# Patient Record
Sex: FEMALE | Race: WHITE | Employment: UNEMPLOYED
[De-identification: names, ages, dates, MRNs, and addresses within clinical notes are randomized per-mention and may not be internally consistent; named-entity substitution may affect disease eponyms.]

---

## 2017-01-17 ENCOUNTER — TELEPHONE (OUTPATIENT)
Dept: NEUROLOGY | Facility: CLINIC | Age: 57
End: 2017-01-17

## 2017-01-17 RX ORDER — BUTALBITAL, ACETAMINOPHEN AND CAFFEINE 50; 325; 40 MG/1; MG/1; MG/1
TABLET ORAL
Qty: 30 TABLET | Refills: 0 | Status: SHIPPED | OUTPATIENT
Start: 2017-01-17 | End: 2017-02-14 | Stop reason: SDUPTHER

## 2017-01-27 DIAGNOSIS — G43.109 MIGRAINE WITH AURA AND WITHOUT STATUS MIGRAINOSUS, NOT INTRACTABLE: ICD-10-CM

## 2017-01-31 ENCOUNTER — TELEPHONE (OUTPATIENT)
Dept: NEUROLOGY | Facility: CLINIC | Age: 57
End: 2017-01-31

## 2017-01-31 DIAGNOSIS — G43.109 MIGRAINE WITH AURA AND WITHOUT STATUS MIGRAINOSUS, NOT INTRACTABLE: ICD-10-CM

## 2017-01-31 RX ORDER — MEPERIDINE HYDROCHLORIDE 50 MG/1
50 TABLET ORAL EVERY 4 HOURS PRN
Qty: 20 TABLET | Refills: 0 | Status: SHIPPED | OUTPATIENT
Start: 2017-01-31 | End: 2017-03-01 | Stop reason: SDUPTHER

## 2017-02-15 RX ORDER — BUTALBITAL, ACETAMINOPHEN AND CAFFEINE 50; 325; 40 MG/1; MG/1; MG/1
TABLET ORAL
Qty: 30 TABLET | Refills: 0 | Status: SHIPPED | OUTPATIENT
Start: 2017-02-15 | End: 2017-03-12 | Stop reason: SDUPTHER

## 2017-03-01 ENCOUNTER — TELEPHONE (OUTPATIENT)
Dept: NEUROLOGY | Facility: CLINIC | Age: 57
End: 2017-03-01

## 2017-03-01 DIAGNOSIS — G43.109 MIGRAINE WITH AURA AND WITHOUT STATUS MIGRAINOSUS, NOT INTRACTABLE: ICD-10-CM

## 2017-03-01 RX ORDER — MEPERIDINE HYDROCHLORIDE 50 MG/1
50 TABLET ORAL EVERY 4 HOURS PRN
Qty: 20 TABLET | Refills: 0 | Status: SHIPPED | OUTPATIENT
Start: 2017-03-01 | End: 2017-03-30 | Stop reason: SDUPTHER

## 2017-03-01 RX ORDER — CODEINE/BUTALBITAL/ASA/CAFFEIN 30-50-325
CAPSULE ORAL
Qty: 30 CAPSULE | Refills: 0 | Status: SHIPPED | OUTPATIENT
Start: 2017-03-01 | End: 2017-03-30 | Stop reason: SDUPTHER

## 2017-03-15 RX ORDER — BUTALBITAL, ACETAMINOPHEN AND CAFFEINE 50; 325; 40 MG/1; MG/1; MG/1
TABLET ORAL
Qty: 30 TABLET | Refills: 0 | Status: SHIPPED | OUTPATIENT
Start: 2017-03-15 | End: 2017-04-12 | Stop reason: SDUPTHER

## 2017-03-30 ENCOUNTER — TELEPHONE (OUTPATIENT)
Dept: NEUROLOGY | Age: 57
End: 2017-03-30

## 2017-03-30 DIAGNOSIS — G43.109 MIGRAINE WITH AURA AND WITHOUT STATUS MIGRAINOSUS, NOT INTRACTABLE: ICD-10-CM

## 2017-03-30 RX ORDER — MEPERIDINE HYDROCHLORIDE 50 MG/1
50 TABLET ORAL EVERY 4 HOURS PRN
Qty: 20 TABLET | Refills: 0 | Status: SHIPPED | OUTPATIENT
Start: 2017-03-30 | End: 2017-05-01 | Stop reason: SDUPTHER

## 2017-03-30 RX ORDER — CODEINE/BUTALBITAL/ASA/CAFFEIN 30-50-325
CAPSULE ORAL
Qty: 30 CAPSULE | Refills: 0 | Status: SHIPPED | OUTPATIENT
Start: 2017-03-30 | End: 2017-05-01 | Stop reason: SDUPTHER

## 2017-04-12 RX ORDER — BUTALBITAL, ACETAMINOPHEN AND CAFFEINE 50; 325; 40 MG/1; MG/1; MG/1
TABLET ORAL
Qty: 30 TABLET | Refills: 0 | Status: SHIPPED | OUTPATIENT
Start: 2017-04-12 | End: 2017-05-09 | Stop reason: SDUPTHER

## 2017-04-26 ENCOUNTER — TELEPHONE (OUTPATIENT)
Dept: NEUROLOGY | Age: 57
End: 2017-04-26

## 2017-05-01 DIAGNOSIS — G43.109 MIGRAINE WITH AURA AND WITHOUT STATUS MIGRAINOSUS, NOT INTRACTABLE: ICD-10-CM

## 2017-05-01 RX ORDER — MEPERIDINE HYDROCHLORIDE 50 MG/1
50 TABLET ORAL EVERY 4 HOURS PRN
Qty: 20 TABLET | Refills: 0 | Status: SHIPPED | OUTPATIENT
Start: 2017-05-01 | End: 2017-05-31 | Stop reason: SDUPTHER

## 2017-05-01 RX ORDER — CODEINE/BUTALBITAL/ASA/CAFFEIN 30-50-325
CAPSULE ORAL
Qty: 30 CAPSULE | Refills: 0 | Status: SHIPPED | OUTPATIENT
Start: 2017-05-01 | End: 2017-05-31 | Stop reason: SDUPTHER

## 2017-05-02 RX ORDER — CODEINE/BUTALBITAL/ASA/CAFFEIN 30-50-325
1 CAPSULE ORAL EVERY 4 HOURS PRN
Qty: 30 CAPSULE | Refills: 0 | Status: SHIPPED | OUTPATIENT
Start: 2017-05-02 | End: 2017-05-03 | Stop reason: SDUPTHER

## 2017-05-02 RX ORDER — CODEINE/BUTALBITAL/ASA/CAFFEIN 30-50-325
CAPSULE ORAL
Qty: 30 CAPSULE | Refills: 0 | Status: CANCELLED | OUTPATIENT
Start: 2017-05-02

## 2017-05-03 ENCOUNTER — TELEPHONE (OUTPATIENT)
Dept: NEUROLOGY | Age: 57
End: 2017-05-03

## 2017-05-11 RX ORDER — BUTALBITAL, ACETAMINOPHEN AND CAFFEINE 50; 325; 40 MG/1; MG/1; MG/1
TABLET ORAL
Qty: 30 TABLET | Refills: 0 | Status: SHIPPED | OUTPATIENT
Start: 2017-05-11 | End: 2017-06-07 | Stop reason: SDUPTHER

## 2017-05-30 ENCOUNTER — TELEPHONE (OUTPATIENT)
Dept: NEUROLOGY | Age: 57
End: 2017-05-30

## 2017-05-31 DIAGNOSIS — G43.109 MIGRAINE WITH AURA AND WITHOUT STATUS MIGRAINOSUS, NOT INTRACTABLE: ICD-10-CM

## 2017-05-31 RX ORDER — CODEINE/BUTALBITAL/ASA/CAFFEIN 30-50-325
CAPSULE ORAL
Qty: 30 CAPSULE | Refills: 0 | Status: SHIPPED | OUTPATIENT
Start: 2017-05-31 | End: 2017-06-29 | Stop reason: SDUPTHER

## 2017-05-31 RX ORDER — MEPERIDINE HYDROCHLORIDE 50 MG/1
50 TABLET ORAL EVERY 4 HOURS PRN
Qty: 20 TABLET | Refills: 0 | Status: SHIPPED | OUTPATIENT
Start: 2017-05-31 | End: 2017-06-28 | Stop reason: SDUPTHER

## 2017-06-12 RX ORDER — BUTALBITAL, ACETAMINOPHEN AND CAFFEINE 50; 325; 40 MG/1; MG/1; MG/1
TABLET ORAL
Qty: 30 TABLET | Refills: 0 | OUTPATIENT
Start: 2017-06-12 | End: 2017-06-28 | Stop reason: SDUPTHER

## 2017-06-28 ENCOUNTER — TELEPHONE (OUTPATIENT)
Dept: NEUROLOGY | Age: 57
End: 2017-06-28

## 2017-06-28 DIAGNOSIS — G43.109 MIGRAINE WITH AURA AND WITHOUT STATUS MIGRAINOSUS, NOT INTRACTABLE: ICD-10-CM

## 2017-06-28 RX ORDER — BUTALBITAL, ACETAMINOPHEN AND CAFFEINE 50; 325; 40 MG/1; MG/1; MG/1
TABLET ORAL
Qty: 30 TABLET | Refills: 0 | Status: SHIPPED | OUTPATIENT
Start: 2017-06-28 | End: 2017-06-28 | Stop reason: CLARIF

## 2017-06-28 RX ORDER — MEPERIDINE HYDROCHLORIDE 50 MG/1
50 TABLET ORAL EVERY 4 HOURS PRN
Qty: 20 TABLET | Refills: 0 | Status: SHIPPED | OUTPATIENT
Start: 2017-06-28 | End: 2017-07-27 | Stop reason: SDUPTHER

## 2017-06-29 ENCOUNTER — TELEPHONE (OUTPATIENT)
Dept: NEUROLOGY | Age: 57
End: 2017-06-29

## 2017-06-29 RX ORDER — CODEINE/BUTALBITAL/ASA/CAFFEIN 30-50-325
CAPSULE ORAL
Qty: 30 CAPSULE | Refills: 0 | Status: SHIPPED | OUTPATIENT
Start: 2017-06-29 | End: 2017-07-26 | Stop reason: SDUPTHER

## 2017-07-12 RX ORDER — BUTALBITAL, ACETAMINOPHEN AND CAFFEINE 50; 325; 40 MG/1; MG/1; MG/1
TABLET ORAL
Qty: 30 TABLET | Refills: 0 | Status: SHIPPED | OUTPATIENT
Start: 2017-07-12 | End: 2017-09-08 | Stop reason: SDUPTHER

## 2017-07-27 DIAGNOSIS — G43.109 MIGRAINE WITH AURA AND WITHOUT STATUS MIGRAINOSUS, NOT INTRACTABLE: ICD-10-CM

## 2017-07-27 RX ORDER — MEPERIDINE HYDROCHLORIDE 50 MG/1
50 TABLET ORAL EVERY 4 HOURS PRN
Qty: 20 TABLET | Refills: 0 | Status: SHIPPED | OUTPATIENT
Start: 2017-07-27 | End: 2017-08-24 | Stop reason: SDUPTHER

## 2017-08-15 DIAGNOSIS — G43.109 MIGRAINE WITH AURA AND WITHOUT STATUS MIGRAINOSUS, NOT INTRACTABLE: ICD-10-CM

## 2017-08-15 RX ORDER — PROMETHAZINE HYDROCHLORIDE 25 MG/1
TABLET ORAL
Qty: 90 TABLET | Refills: 0 | Status: SHIPPED | OUTPATIENT
Start: 2017-08-15 | End: 2020-05-26 | Stop reason: SDUPTHER

## 2017-08-15 RX ORDER — VERAPAMIL HYDROCHLORIDE 240 MG/1
240 CAPSULE, EXTENDED RELEASE ORAL NIGHTLY
Qty: 90 CAPSULE | Refills: 2 | Status: SHIPPED | OUTPATIENT
Start: 2017-08-15 | End: 2018-08-07 | Stop reason: ALTCHOICE

## 2017-08-24 ENCOUNTER — TELEPHONE (OUTPATIENT)
Dept: NEUROLOGY | Age: 57
End: 2017-08-24

## 2017-08-24 DIAGNOSIS — G43.109 MIGRAINE WITH AURA AND WITHOUT STATUS MIGRAINOSUS, NOT INTRACTABLE: ICD-10-CM

## 2017-08-24 RX ORDER — CODEINE/BUTALBITAL/ASA/CAFFEIN 30-50-325
CAPSULE ORAL
Qty: 30 CAPSULE | Refills: 0 | Status: SHIPPED | OUTPATIENT
Start: 2017-08-24 | End: 2017-09-21 | Stop reason: SDUPTHER

## 2017-08-24 RX ORDER — MEPERIDINE HYDROCHLORIDE 50 MG/1
50 TABLET ORAL EVERY 4 HOURS PRN
Qty: 20 TABLET | Refills: 0 | Status: SHIPPED | OUTPATIENT
Start: 2017-08-24 | End: 2017-09-21 | Stop reason: SDUPTHER

## 2017-09-11 RX ORDER — BUTALBITAL, ACETAMINOPHEN AND CAFFEINE 50; 325; 40 MG/1; MG/1; MG/1
TABLET ORAL
Qty: 30 TABLET | Refills: 0 | Status: SHIPPED | OUTPATIENT
Start: 2017-09-11 | End: 2017-10-10 | Stop reason: SDUPTHER

## 2017-09-21 ENCOUNTER — TELEPHONE (OUTPATIENT)
Dept: NEUROLOGY | Age: 57
End: 2017-09-21

## 2017-09-21 DIAGNOSIS — G43.109 MIGRAINE WITH AURA AND WITHOUT STATUS MIGRAINOSUS, NOT INTRACTABLE: ICD-10-CM

## 2017-09-21 RX ORDER — MEPERIDINE HYDROCHLORIDE 50 MG/1
50 TABLET ORAL EVERY 4 HOURS PRN
Qty: 20 TABLET | Refills: 0 | Status: SHIPPED | OUTPATIENT
Start: 2017-09-21 | End: 2017-10-23 | Stop reason: SDUPTHER

## 2017-09-21 RX ORDER — CODEINE/BUTALBITAL/ASA/CAFFEIN 30-50-325
CAPSULE ORAL
Qty: 30 CAPSULE | Refills: 0 | Status: SHIPPED | OUTPATIENT
Start: 2017-09-21 | End: 2017-10-23 | Stop reason: SDUPTHER

## 2017-10-11 RX ORDER — BUTALBITAL, ACETAMINOPHEN AND CAFFEINE 50; 325; 40 MG/1; MG/1; MG/1
TABLET ORAL
Qty: 30 TABLET | Refills: 0 | Status: SHIPPED | OUTPATIENT
Start: 2017-10-11 | End: 2017-11-10 | Stop reason: SDUPTHER

## 2017-10-23 ENCOUNTER — TELEPHONE (OUTPATIENT)
Dept: NEUROLOGY | Age: 57
End: 2017-10-23

## 2017-10-23 DIAGNOSIS — G43.109 MIGRAINE WITH AURA AND WITHOUT STATUS MIGRAINOSUS, NOT INTRACTABLE: ICD-10-CM

## 2017-10-23 RX ORDER — CODEINE/BUTALBITAL/ASA/CAFFEIN 30-50-325
CAPSULE ORAL
Qty: 30 CAPSULE | Refills: 0 | Status: SHIPPED | OUTPATIENT
Start: 2017-10-23 | End: 2017-11-21 | Stop reason: SDUPTHER

## 2017-10-23 RX ORDER — MEPERIDINE HYDROCHLORIDE 50 MG/1
50 TABLET ORAL EVERY 4 HOURS PRN
Qty: 20 TABLET | Refills: 0 | Status: SHIPPED | OUTPATIENT
Start: 2017-10-23 | End: 2017-11-21 | Stop reason: SDUPTHER

## 2017-10-23 NOTE — TELEPHONE ENCOUNTER
Shasha Zoe called in for a refill on her Demerol 50 mg. #20 and Fiorinal with codeine #30 to [facility] in [address] [facility].  An OARRS report was run in Sept.

## 2017-11-13 RX ORDER — BUTALBITAL, ACETAMINOPHEN AND CAFFEINE 50; 325; 40 MG/1; MG/1; MG/1
TABLET ORAL
Qty: 30 TABLET | Refills: 0 | OUTPATIENT
Start: 2017-11-13 | End: 2017-12-10 | Stop reason: SDUPTHER

## 2017-11-21 ENCOUNTER — TELEPHONE (OUTPATIENT)
Dept: NEUROLOGY | Age: 57
End: 2017-11-21

## 2017-11-21 DIAGNOSIS — G43.109 MIGRAINE WITH AURA AND WITHOUT STATUS MIGRAINOSUS, NOT INTRACTABLE: ICD-10-CM

## 2017-11-21 RX ORDER — MEPERIDINE HYDROCHLORIDE 50 MG/1
50 TABLET ORAL EVERY 4 HOURS PRN
Qty: 20 TABLET | Refills: 0 | Status: SHIPPED | OUTPATIENT
Start: 2017-11-21 | End: 2019-10-21 | Stop reason: ALTCHOICE

## 2017-11-21 RX ORDER — CODEINE/BUTALBITAL/ASA/CAFFEIN 30-50-325
CAPSULE ORAL
Qty: 30 CAPSULE | Refills: 0 | Status: SHIPPED | OUTPATIENT
Start: 2017-11-21 | End: 2018-02-08 | Stop reason: SDUPTHER

## 2017-11-21 NOTE — TELEPHONE ENCOUNTER
Cecilio Shore called in and lm on 11/20 asking for refills on her Fiorinal with codeine #30 and her Demerol 50 mg. #20. They should go to the [facility] in [place] at [facility].

## 2017-12-11 RX ORDER — BUTALBITAL, ACETAMINOPHEN AND CAFFEINE 50; 325; 40 MG/1; MG/1; MG/1
TABLET ORAL
Qty: 30 TABLET | Refills: 0 | Status: SHIPPED | OUTPATIENT
Start: 2017-12-11 | End: 2018-01-10 | Stop reason: SDUPTHER

## 2017-12-18 ENCOUNTER — OFFICE VISIT (OUTPATIENT)
Dept: NEUROLOGY | Age: 57
End: 2017-12-18

## 2017-12-18 VITALS
WEIGHT: 252 LBS | SYSTOLIC BLOOD PRESSURE: 133 MMHG | BODY MASS INDEX: 43.02 KG/M2 | DIASTOLIC BLOOD PRESSURE: 89 MMHG | HEART RATE: 81 BPM | HEIGHT: 64 IN

## 2017-12-18 DIAGNOSIS — G43.109 MIGRAINE WITH AURA AND WITHOUT STATUS MIGRAINOSUS, NOT INTRACTABLE: Primary | ICD-10-CM

## 2017-12-18 PROCEDURE — 99213 OFFICE O/P EST LOW 20 MIN: CPT | Performed by: PSYCHIATRY & NEUROLOGY

## 2017-12-18 RX ORDER — CODEINE/BUTALBITAL/ASA/CAFFEIN 30-50-325
1 CAPSULE ORAL EVERY 4 HOURS PRN
Qty: 30 CAPSULE | Refills: 0 | Status: SHIPPED | OUTPATIENT
Start: 2017-12-18 | End: 2017-12-28

## 2017-12-18 RX ORDER — PROMETHAZINE HYDROCHLORIDE 25 MG/1
25 TABLET ORAL EVERY 6 HOURS PRN
Qty: 90 TABLET | Refills: 1 | Status: SHIPPED | OUTPATIENT
Start: 2017-12-18 | End: 2017-12-25

## 2017-12-18 RX ORDER — MEPERIDINE HYDROCHLORIDE 50 MG/1
50 TABLET ORAL EVERY 8 HOURS PRN
Qty: 20 TABLET | Refills: 0 | Status: SHIPPED | OUTPATIENT
Start: 2017-12-18 | End: 2017-12-28

## 2017-12-18 RX ORDER — VERAPAMIL HYDROCHLORIDE 240 MG/1
240 TABLET, FILM COATED, EXTENDED RELEASE ORAL NIGHTLY
Qty: 90 TABLET | Refills: 3 | Status: SHIPPED | OUTPATIENT
Start: 2017-12-18 | End: 2018-08-07 | Stop reason: SDUPTHER

## 2017-12-18 NOTE — LETTER
MOTOR FUNCTION: Normal bulk, normal tone, normal power, no involuntary movements, no tremor   SENSORY FUNCTION: Normal touch, normal pin, normal vibration   CEREBELLAR FUNCTION: Intact fine motor control   REFLEX FUNCTION: Symmetric but reduced in legs, no pathologic reflex   STATION and GAIT Normal station, slight waddling gait                  ASSESSMENT and  PLAN:      In summary, your patient, Dominick Casper appears stable. There are no new lateralizing or localizing neurologic deficits nor signs of toxicity from her polypharmacy. She feels much better about herself with the weight loss and walks better and has less knee discomfort. There is no need for additional neurologic investigation or a change in therapy but I will solicit her recent laboratories to be sure there is no occult toxicity from the medications prescribed. She will keep me informed of any questions or new issues and will return to the office semiannually for reevaluation, as long as she remains stable and content. If you have questions, please do not hesitate to call me. I look forward to following Yaakov Vera along with you.     Sincerely,        Georgia Beltran MD

## 2017-12-18 NOTE — PROGRESS NOTES
Outpatient Prescriptions Marked as Taking for the 12/18/17 encounter (Office Visit) with Marleny Sandoval MD   Medication Sig Dispense Refill    butalbital-acetaminophen-caffeine (FIORICET, ESGIC) -40 MG per tablet TAKE 1 TABLET BY MOUTH EVERY 4 HOURS AS NEEDED FOR HEADACHE 30 tablet 0    meperidine (DEMEROL) 50 MG tablet Take 1 tablet by mouth every 4 hours as needed for Pain . 20 tablet 0    butalbital-aspirin-caffeine-codeine (ASCOMP-CODEINE) -18-30 MG capsule TAKE 1 CAPSULE BY MOUTH EVERY 4 HOURS AS NEEDED FOR PAIN. 30 capsule 0    promethazine (PHENERGAN) 25 MG tablet TAKE 1 TABLET EVERY 8 HOURS AS NEEDED FOR NAUSEA 90 tablet 0    verapamil (VERELAN) 240 MG extended release capsule Take 1 capsule by mouth nightly 90 capsule 2    verapamil (CALAN-SR) 240 MG CR tablet TAKE ONE TABLET BY MOUTH EVERY NIGHT AT BEDTIME 90 tablet 3    omeprazole (PRILOSEC) 40 MG capsule Take 40 mg by mouth daily      Montelukast Sodium (SINGULAIR PO) Take by mouth daily      Levocetirizine Dihydrochloride (XYZAL PO) Take 1 tablet by mouth daily      estradiol (CLIMARA) 0.0375 MG/24HR Place 1 patch onto the skin once a week      aspirin 325 MG tablet Take 325 mg by mouth every other day       POTASSIUM CHLORIDE PO Take  by mouth daily.  alprazolam (XANAX) 0.25 MG tablet 1 tablet as needed       meloxicam (MOBIC) 15 MG tablet 1 tablet nightly.  LOVAZA 1 GM capsule 2 capsules 2 times daily.  Misc Natural Products (OSTEO BI-FLEX ADV DOUBLE ST PO) Take 2 capsules by mouth 2 times daily.  atorvastatin (LIPITOR) 80 MG tablet Take 80 mg by mouth daily.  Multiple Vitamin (MULTIVITAMIN PO) Take  by mouth daily.  hydrochlorothiazide (HYDRODIURIL) 25 MG tablet Take 25 mg by mouth daily. PHYSICAL EXAMINATION                                              . General Appearance:  Alert, cooperative, no distress, appears stated age, overweight, well dressed and groomed   Head:  Normocephalic, without obvious abnormality, atraumatic   Eyes:  PERRL, conjunctiva/corneas clear, EOM's intact   Ears:  Normal external ear canals   Nose: Nares normal, septum midline, mucosa normal   Throat: Lips, mucosa, and tongue normal; teeth and gums normal   Neck: Supple neck, trachea midline, no adenopathy, thyroid: not enlarged, symmetric, no tenderness/mass/nodules, no carotid pulse abnormality   Back:   Symmetric, no curvature, ROM normal   Lungs:   Respirations unlabored   Chest Wall:  No deformity   Heart:  Regular rate and rhythm   Abdomen:   No masses   Extremities: Extremities normal, atraumatic, no cyanosis or edema   Pulses: 2+ and symmetric over head and neck   Skin: Skin color, texture, turgor normal, no rashes or lesions   Lymph nodes: Cervical, supraclavicular, and axillary nodes normal                                         NEUROLOGIC EXAMINATION    MENTAL STATUS: Alert, oriented, intact memory, no confusion, normal speech, normal language, no hallucination or delusion   CRANIAL NERVES: II     -      Visual fields intact to confrontation  III,IV,VI -  EOMs full, no afferent defect, no                      CARMITA, no ptosis, no nystagmus  V     -     Normal facial sensation  VII    -     Normal facial symmetry  VIII   -     Intact hearing  IX,X -     Symmetrical palate  XI    -     Symmetrical shoulder shrug  XII   -     Midline tongue, no atrophy    MOTOR FUNCTION: Normal bulk, normal tone, normal power, no involuntary movements, no tremor   SENSORY FUNCTION: Normal touch, normal pin, normal vibration   CEREBELLAR FUNCTION: Intact fine motor control   REFLEX FUNCTION: Symmetric but reduced in legs, no pathologic reflex   STATION and GAIT Normal station, slight waddling gait                  ASSESSMENT and  PLAN:      In summary, your patient, Jamison Barboza

## 2017-12-18 NOTE — COMMUNICATION BODY
HPI:        Your patient, Chikis Peralta returns for ongoing neurologic care of her migraine disorder associated with aura. Her condition is well managed and continues to benefit from Verapamil, and occasional Fioricet with Phenergan as well as rare p.r.n. Demerol when a headache breaks through. There has been no evidence for narcotic drug abuse or overuse and no evidence for drug toxicity over many years of neurologic care. The last visit to the hospital for headache management was over 4 years ago. She remains very content with the present schedule of medication. She reports no other medical or surgical issues, infections, intoxications or traumas to complicate her underlying migraine disorder. She has been able to keep off some weight and her knees feel better with the weight loss.                                     REVIEW OF SYSTEMS    CONSTITUTIONAL Weight: absent, Appetite: absent, Fatigue: absent - no change      HEENT Ears: absent, Eyes: glasses, Visual disturbance: absent   RESPIRATORY Shortness of breath: absent, Cough: absent   CARDIOVASCULAR Chest pain: absent, Leg swelling :absent      GI Constipation: absent, Diarrhea: absent, Swallowing change: absent       Urinary frequency: absent, Urinary urgency: absent, Urinary incontinence: absent   MUSCULOSKELETAL Neck pain: absent, Back pain: absent, Stiffness: absent, Muscle pain: absent, Joint pain: present, Restless legs: absent   DERMATOLOGIC Hair loss: absent, Skin changes: absent   NEUROLOGIC Memory loss: absent, Confusion: absent, Seizures: absent Trouble walking or imbalance: absent, Dizziness: absent, Weakness: absent, Numbness: absent Tremor: absent, Spasm: absent, Speech difficulty: present, Headache: present, Light sensitivity: absent   PSYCHIATRIC Anxiety: absent, Hallucination: absent, Mood disorder: absent   HEMATOLOGIC Abnormal bleeding: absent, Anemia: absent, Clotting disorder: absent, Lymph gland changes: absent Outpatient Prescriptions Marked as Taking for the 12/18/17 encounter (Office Visit) with Lupillo Bonner MD   Medication Sig Dispense Refill    butalbital-acetaminophen-caffeine (FIORICET, ESGIC) -40 MG per tablet TAKE 1 TABLET BY MOUTH EVERY 4 HOURS AS NEEDED FOR HEADACHE 30 tablet 0    meperidine (DEMEROL) 50 MG tablet Take 1 tablet by mouth every 4 hours as needed for Pain . 20 tablet 0    butalbital-aspirin-caffeine-codeine (ASCOMP-CODEINE) -23-30 MG capsule TAKE 1 CAPSULE BY MOUTH EVERY 4 HOURS AS NEEDED FOR PAIN. 30 capsule 0    promethazine (PHENERGAN) 25 MG tablet TAKE 1 TABLET EVERY 8 HOURS AS NEEDED FOR NAUSEA 90 tablet 0    verapamil (VERELAN) 240 MG extended release capsule Take 1 capsule by mouth nightly 90 capsule 2    verapamil (CALAN-SR) 240 MG CR tablet TAKE ONE TABLET BY MOUTH EVERY NIGHT AT BEDTIME 90 tablet 3    omeprazole (PRILOSEC) 40 MG capsule Take 40 mg by mouth daily      Montelukast Sodium (SINGULAIR PO) Take by mouth daily      Levocetirizine Dihydrochloride (XYZAL PO) Take 1 tablet by mouth daily      estradiol (CLIMARA) 0.0375 MG/24HR Place 1 patch onto the skin once a week      aspirin 325 MG tablet Take 325 mg by mouth every other day       POTASSIUM CHLORIDE PO Take  by mouth daily.  alprazolam (XANAX) 0.25 MG tablet 1 tablet as needed       meloxicam (MOBIC) 15 MG tablet 1 tablet nightly.  LOVAZA 1 GM capsule 2 capsules 2 times daily.  Misc Natural Products (OSTEO BI-FLEX ADV DOUBLE ST PO) Take 2 capsules by mouth 2 times daily.  atorvastatin (LIPITOR) 80 MG tablet Take 80 mg by mouth daily.  Multiple Vitamin (MULTIVITAMIN PO) Take  by mouth daily.  hydrochlorothiazide (HYDRODIURIL) 25 MG tablet Take 25 mg by mouth daily. PHYSICAL EXAMINATION                                              . appears stable. There are no new lateralizing or localizing neurologic deficits nor signs of toxicity from her polypharmacy. She feels much better about herself with the weight loss and walks better and has less knee discomfort. There is no need for additional neurologic investigation or a change in therapy but I will solicit her recent laboratories to be sure there is no occult toxicity from the medications prescribed. She will keep me informed of any questions or new issues and will return to the office semiannually for reevaluation, as long as she remains stable and content.

## 2018-01-11 RX ORDER — BUTALBITAL, ACETAMINOPHEN AND CAFFEINE 50; 325; 40 MG/1; MG/1; MG/1
TABLET ORAL
Qty: 30 TABLET | Refills: 0 | Status: SHIPPED | OUTPATIENT
Start: 2018-01-11 | End: 2018-02-08 | Stop reason: SDUPTHER

## 2018-02-08 ENCOUNTER — TELEPHONE (OUTPATIENT)
Dept: NEUROLOGY | Age: 58
End: 2018-02-08

## 2018-02-08 DIAGNOSIS — G43.109 MIGRAINE WITH AURA AND WITHOUT STATUS MIGRAINOSUS, NOT INTRACTABLE: ICD-10-CM

## 2018-02-08 RX ORDER — BUTALBITAL, ACETAMINOPHEN AND CAFFEINE 50; 325; 40 MG/1; MG/1; MG/1
TABLET ORAL
Qty: 30 TABLET | Refills: 2 | Status: SHIPPED | OUTPATIENT
Start: 2018-02-08 | End: 2020-05-26 | Stop reason: SDUPTHER

## 2018-02-08 RX ORDER — CODEINE/BUTALBITAL/ASA/CAFFEIN 30-50-325
CAPSULE ORAL
Qty: 30 CAPSULE | Refills: 2 | Status: SHIPPED | OUTPATIENT
Start: 2018-02-08 | End: 2019-04-22 | Stop reason: SDUPTHER

## 2018-02-08 NOTE — TELEPHONE ENCOUNTER
Toan Cabrerar called in today and lm. She is anxious and concerned because she has not heard back about her refills for Fioricet, Fiorinal with cod. and Demerol. that call was from January 19, 2017. She said that she has gotten her RX on a monthly basis for years from Dr. Randy Diop. She said as stated in the previous messge they keep her from having to go to ER. Per the previous phone call Luis Brown said she still gets migraines every month, sometimes multiples. When she wakes up, if she has a migraine she will start off by taking a Fioricet, then in 2 hours if the migraine is still there she will take a Fiorinal with codeine. If that does not work in a couple of hours she will then take the Demerol. She said sometimes she needs to take this cocktail 3-4 days in a row because of migraines to keep from going to the ER. She has not been to the ER in 4 years, she treats all of her migraines at home. She said she can not come in next week, which I offered, due to being in [place]. She lives there and only comes back to [place] for her followup\". She is getting quite anxious. She has no Fioricet. She has 4 Fiorinal with cod. and 5 Demerol left. Please advise if you will consider refilling them.

## 2018-02-09 ENCOUNTER — TELEPHONE (OUTPATIENT)
Dept: NEUROLOGY | Age: 58
End: 2018-02-09

## 2018-02-09 NOTE — TELEPHONE ENCOUNTER
I spoke with Guillermo Kent this morning and let her know that Dr. Art Mancuso had approved the Fioricet and Fiorinal, but wanted to see her before ordering Demerol. I ask Guillermo Kent if she would be able to come sooner than her July appointment. She will, if she can get a good price for airfare, she is the only one working. She ask that I keep her current appointment She will call as soon as she finds a deal to see if we can get her in prior to booking the flight.

## 2018-08-07 ENCOUNTER — OFFICE VISIT (OUTPATIENT)
Dept: NEUROLOGY | Age: 58
End: 2018-08-07
Payer: COMMERCIAL

## 2018-08-07 VITALS
HEART RATE: 87 BPM | SYSTOLIC BLOOD PRESSURE: 129 MMHG | HEIGHT: 64 IN | WEIGHT: 262.6 LBS | BODY MASS INDEX: 44.83 KG/M2 | DIASTOLIC BLOOD PRESSURE: 84 MMHG

## 2018-08-07 DIAGNOSIS — G43.109 MIGRAINE WITH AURA AND WITHOUT STATUS MIGRAINOSUS, NOT INTRACTABLE: Primary | ICD-10-CM

## 2018-08-07 PROCEDURE — 99215 OFFICE O/P EST HI 40 MIN: CPT | Performed by: PSYCHIATRY & NEUROLOGY

## 2018-08-07 RX ORDER — RIZATRIPTAN BENZOATE 10 MG/1
TABLET ORAL
Qty: 6 TABLET | Refills: 1 | Status: SHIPPED | OUTPATIENT
Start: 2018-08-07 | End: 2019-04-22

## 2018-08-07 RX ORDER — VERAPAMIL HYDROCHLORIDE 240 MG/1
240 TABLET, FILM COATED, EXTENDED RELEASE ORAL NIGHTLY
Qty: 90 TABLET | Refills: 3 | Status: SHIPPED | OUTPATIENT
Start: 2018-08-07 | End: 2019-04-22 | Stop reason: SDUPTHER

## 2018-08-07 ASSESSMENT — ENCOUNTER SYMPTOMS
ALLERGIC/IMMUNOLOGIC NEGATIVE: 1
GASTROINTESTINAL NEGATIVE: 1
RESPIRATORY NEGATIVE: 1
EYES NEGATIVE: 1

## 2018-08-07 NOTE — LETTER
[id number] [facility] OF UTERUS  2007    with ablation    FOOT SURGERY      GALLBLADDER SURGERY  2001    HYSTERECTOMY  2008       Family History   Problem Relation Age of Onset    Heart Disease Mother     Heart Disease Father     Hypertension Father     Diabetes Father     Stroke Father        Social History     Social History    Marital status:      Spouse name: N/A    Number of children: N/A    Years of education: N/A     Social History Main Topics    Smoking status: Never Smoker    Smokeless tobacco: Never Used    Alcohol use No    Drug use: No    Sexual activity: Not Asked     Other Topics Concern    None     Social History Narrative    None       Current Outpatient Prescriptions   Medication Sig Dispense Refill    butalbital-acetaminophen-caffeine (FIORICET, ESGIC) -40 MG per tablet TAKE 1 TABLET BY MOUTH EVERY 4 HOURS AS NEEDED FOR HEADACHE 30 tablet 2    butalbital-aspirin-caffeine-codeine (ASCOMP-CODEINE) -21-30 MG capsule TAKE 1 CAPSULE BY MOUTH EVERY 4 HOURS AS NEEDED FOR PAIN. 30 capsule 2    verapamil (CALAN SR) 240 MG extended release tablet Take 1 tablet by mouth nightly 90 tablet 3    meperidine (DEMEROL) 50 MG tablet Take 1 tablet by mouth every 4 hours as needed for Pain . 20 tablet 0    promethazine (PHENERGAN) 25 MG tablet TAKE 1 TABLET EVERY 8 HOURS AS NEEDED FOR NAUSEA 90 tablet 0    omeprazole (PRILOSEC) 40 MG capsule Take 40 mg by mouth daily      Montelukast Sodium (SINGULAIR PO) Take by mouth daily      Levocetirizine Dihydrochloride (XYZAL PO) Take 1 tablet by mouth daily      estradiol (CLIMARA) 0.0375 MG/24HR Place 1 patch onto the skin once a week      POTASSIUM CHLORIDE PO Take  by mouth daily.  meloxicam (MOBIC) 15 MG tablet 1 tablet nightly.  LOVAZA 1 GM capsule 2 capsules 2 times daily.  Misc Natural Products (OSTEO BI-FLEX ADV DOUBLE ST PO) Take 2 capsules by mouth 2 times daily. Language process and speech normal . No aphasia   Cranial nerve 2 normal acuety and visual fields  Cranial nerve 3, 4 and 6 . Extraocular muscles are intact . Pupils are equal and reactive   Cranial nerve 5 . Normal strength of masseter and temporalis . Intact corneal reflex. Normal facial sensation  Cranial nerve 7 normal exam   Cranial nerve 8. Grossly intact hearing   Cranial nerve 9 and 10. Symmetric palate elevation   Cranial nerve 11 , 5 out of 5 strength   Cranial Nerve 12 midline tongue . No atrophy  Sensation . Normal proprioception . Intact Vibration . Normal pinprick and light touch   Deep Tendon Reflexes normal  Plantar response flexor bilaterally    Assessment:       Diagnosis Orders   1. Migraine with aura and without status migrainosus, not intractable  verapamil (CALAN SR) 240 MG extended release tablet   Patient is to continue on calan as migraine prophylactic although will try maxalt as initial abortive therapy       Plan:      As above           If you have questions, please do not hesitate to call me. I look forward to following Olman Ruiz along with you.     Sincerely,        Marquez Villa MD    CC providers:  Fredo Thomas MD  [address] [place] [address]: [phone]

## 2018-08-07 NOTE — PROGRESS NOTES
Subjective:      Patient ID: Radha Conn is a 66 y.o. female. HPI  Active Problem migraine with aura having followed with Dr Char Sosa . The condition is she reports that she is having headaches one to twice per month . She reports that headaches are behind right eye of pulling stabbing pain grade 10 over 10 with nausea and vomiting . At headache onset she will take phenergan then fioricet . If headache is still there in 2 hours she will use fiorinal with codeine and if still headache in 2 hours will use demerol . She has not had ER visit for the past 5 years . She has tried imitrex , cafergot , aleve and excedrin with no help . She has been also on inderal and depakote as prophylactic . There is no focal motor ,sensory,  bulbar or visual complaints . She reports that headaches are upon awakening from sleep with sinus pressure . There is no snoring with no daytime sleepiness. She has had 2 sleep studies the last 10 years ago with no sleep apnea  . Significant medications calan  mg po qhs , fioricet PRN , fioricet with codeine PRN , phenergan 25 mg PRN, demeral 50 mg PRN  . Testing MRI of Head normal , 2014 . Carotid US  nonstenotic plaque .  Cardiac 2 D echo mild LVH EF 55-60 % , 2014      Past Medical History:   Diagnosis Date    Anxiety     Gastroesophageal reflux disease     IBS (irritable bowel syndrome)     Migraine with aura     Torn meniscus     right knee       Past Surgical History:   Procedure Laterality Date     SECTION      DILATION AND CURETTAGE OF UTERUS      with ablation    FOOT SURGERY      GALLBLADDER SURGERY      HYSTERECTOMY         Family History   Problem Relation Age of Onset    Heart Disease Mother     Heart Disease Father     Hypertension Father     Diabetes Father     Stroke Father        Social History     Social History    Marital status:      Spouse name: N/A    Number of children: N/A    Years of education: N/A Social History Main Topics    Smoking status: Never Smoker    Smokeless tobacco: Never Used    Alcohol use No    Drug use: No    Sexual activity: Not Asked     Other Topics Concern    None     Social History Narrative    None       Current Outpatient Prescriptions   Medication Sig Dispense Refill    butalbital-acetaminophen-caffeine (FIORICET, ESGIC) -40 MG per tablet TAKE 1 TABLET BY MOUTH EVERY 4 HOURS AS NEEDED FOR HEADACHE 30 tablet 2    butalbital-aspirin-caffeine-codeine (ASCOMP-CODEINE) -95-30 MG capsule TAKE 1 CAPSULE BY MOUTH EVERY 4 HOURS AS NEEDED FOR PAIN. 30 capsule 2    verapamil (CALAN SR) 240 MG extended release tablet Take 1 tablet by mouth nightly 90 tablet 3    meperidine (DEMEROL) 50 MG tablet Take 1 tablet by mouth every 4 hours as needed for Pain . 20 tablet 0    promethazine (PHENERGAN) 25 MG tablet TAKE 1 TABLET EVERY 8 HOURS AS NEEDED FOR NAUSEA 90 tablet 0    omeprazole (PRILOSEC) 40 MG capsule Take 40 mg by mouth daily      Montelukast Sodium (SINGULAIR PO) Take by mouth daily      Levocetirizine Dihydrochloride (XYZAL PO) Take 1 tablet by mouth daily      estradiol (CLIMARA) 0.0375 MG/24HR Place 1 patch onto the skin once a week      POTASSIUM CHLORIDE PO Take  by mouth daily.  meloxicam (MOBIC) 15 MG tablet 1 tablet nightly.  LOVAZA 1 GM capsule 2 capsules 2 times daily.  Misc Natural Products (OSTEO BI-FLEX ADV DOUBLE ST PO) Take 2 capsules by mouth 2 times daily.  atorvastatin (LIPITOR) 80 MG tablet Take 40 mg by mouth daily       Multiple Vitamin (MULTIVITAMIN PO) Take  by mouth daily.  hydrochlorothiazide (HYDRODIURIL) 25 MG tablet Take 25 mg by mouth daily. No current facility-administered medications for this visit.         Allergies   Allergen Reactions    Avelox [Moxifloxacin] Anaphylaxis    Dilaudid [Hydromorphone Hcl] Anaphylaxis    Erythromycin Hives    Fentanyl      insomnia    Morphine      anxiety

## 2018-08-08 NOTE — COMMUNICATION BODY
Social History Main Topics    Smoking status: Never Smoker    Smokeless tobacco: Never Used    Alcohol use No    Drug use: No    Sexual activity: Not Asked     Other Topics Concern    None     Social History Narrative    None       Current Outpatient Prescriptions   Medication Sig Dispense Refill    butalbital-acetaminophen-caffeine (FIORICET, ESGIC) -40 MG per tablet TAKE 1 TABLET BY MOUTH EVERY 4 HOURS AS NEEDED FOR HEADACHE 30 tablet 2    butalbital-aspirin-caffeine-codeine (ASCOMP-CODEINE) -70-30 MG capsule TAKE 1 CAPSULE BY MOUTH EVERY 4 HOURS AS NEEDED FOR PAIN. 30 capsule 2    verapamil (CALAN SR) 240 MG extended release tablet Take 1 tablet by mouth nightly 90 tablet 3    meperidine (DEMEROL) 50 MG tablet Take 1 tablet by mouth every 4 hours as needed for Pain . 20 tablet 0    promethazine (PHENERGAN) 25 MG tablet TAKE 1 TABLET EVERY 8 HOURS AS NEEDED FOR NAUSEA 90 tablet 0    omeprazole (PRILOSEC) 40 MG capsule Take 40 mg by mouth daily      Montelukast Sodium (SINGULAIR PO) Take by mouth daily      Levocetirizine Dihydrochloride (XYZAL PO) Take 1 tablet by mouth daily      estradiol (CLIMARA) 0.0375 MG/24HR Place 1 patch onto the skin once a week      POTASSIUM CHLORIDE PO Take  by mouth daily.  meloxicam (MOBIC) 15 MG tablet 1 tablet nightly.  LOVAZA 1 GM capsule 2 capsules 2 times daily.  Misc Natural Products (OSTEO BI-FLEX ADV DOUBLE ST PO) Take 2 capsules by mouth 2 times daily.  atorvastatin (LIPITOR) 80 MG tablet Take 40 mg by mouth daily       Multiple Vitamin (MULTIVITAMIN PO) Take  by mouth daily.  hydrochlorothiazide (HYDRODIURIL) 25 MG tablet Take 25 mg by mouth daily. No current facility-administered medications for this visit.         Allergies   Allergen Reactions    Avelox [Moxifloxacin] Anaphylaxis    Dilaudid [Hydromorphone Hcl] Anaphylaxis    Erythromycin Hives    Fentanyl      insomnia    Morphine      anxiety  Nubain [Nalbuphine Hcl]      Flu symptoms    Penicillins Hives    Prochlorperazine Edisylate      Hallucinations    Sulfa Antibiotics Hives    Tigan [Trimethobenzamide-Benzocaine]      HYPERTENSION       Review of Systems   Constitutional: Negative. HENT: Positive for tinnitus. Eyes: Negative. Respiratory: Negative. Cardiovascular: Negative. Gastrointestinal: Negative. Endocrine: Negative. Genitourinary: Negative. Musculoskeletal: Positive for gait problem and myalgias. Allergic/Immunologic: Negative. Neurological: Positive for headaches. Hematological: Negative. Psychiatric/Behavioral: Negative. Objective:   Physical Exam  Vitals:    08/07/18 1203   BP: 129/84   Pulse: 87     weight: 262 lb 9.6 oz (119.1 kg)    Neurological Examination  Constitutional .General exam well groomed   Head/Ears /Nose/Throat: external ear . Normal exam  Neck and thyroid . Normal size. No bruits  Respiratory . Breathsounds clear bilaterally  Cardiovascular: Auscultation of heart with regular rate and rhythm  Musculoskeletal. Muscle bulk and tone normal                                                           Muscle strength 5/5 strength throughout                                                                                No dysmetria or dysdiadokinesis  No tremor   Normal fine motor  Gait normal   Orientation Alert and oriented x 3   Attention and concentration normal  Short term memory normal  Language process and speech normal . No aphasia   Cranial nerve 2 normal acuety and visual fields  Cranial nerve 3, 4 and 6 . Extraocular muscles are intact . Pupils are equal and reactive   Cranial nerve 5 . Normal strength of masseter and temporalis . Intact corneal reflex. Normal facial sensation  Cranial nerve 7 normal exam   Cranial nerve 8. Grossly intact hearing   Cranial nerve 9 and 10. Symmetric palate elevation   Cranial nerve 11 , 5 out of 5 strength   Cranial Nerve 12 midline tongue .  No atrophy  Sensation . Normal proprioception . Intact Vibration . Normal pinprick and light touch   Deep Tendon Reflexes normal  Plantar response flexor bilaterally    Assessment:       Diagnosis Orders   1.  Migraine with aura and without status migrainosus, not intractable  verapamil (CALAN SR) 240 MG extended release tablet   Patient is to continue on calan as migraine prophylactic although will try maxalt as initial abortive therapy       Plan:      As above

## 2018-08-28 ENCOUNTER — TELEPHONE (OUTPATIENT)
Dept: NEUROLOGY | Age: 58
End: 2018-08-28

## 2018-08-28 NOTE — TELEPHONE ENCOUNTER
At the 8/7/2018 follow up appointment Dr. Mahamed Chau ask that I make a note. He will no longer prescribe Demerol for [place].

## 2018-11-12 DIAGNOSIS — G43.109 MIGRAINE WITH AURA AND WITHOUT STATUS MIGRAINOSUS, NOT INTRACTABLE: ICD-10-CM

## 2018-11-14 RX ORDER — CODEINE/BUTALBITAL/ASA/CAFFEIN 30-50-325
1 CAPSULE ORAL EVERY 4 HOURS PRN
Qty: 30 CAPSULE | Refills: 2 | OUTPATIENT
Start: 2018-11-14 | End: 2019-11-14

## 2018-11-14 RX ORDER — BUTALBITAL, ACETAMINOPHEN AND CAFFEINE 50; 325; 40 MG/1; MG/1; MG/1
TABLET ORAL
Qty: 30 TABLET | Refills: 2 | OUTPATIENT
Start: 2018-11-14

## 2019-04-22 ENCOUNTER — OFFICE VISIT (OUTPATIENT)
Dept: NEUROLOGY | Age: 59
End: 2019-04-22
Payer: COMMERCIAL

## 2019-04-22 VITALS
WEIGHT: 273 LBS | SYSTOLIC BLOOD PRESSURE: 125 MMHG | HEIGHT: 64 IN | BODY MASS INDEX: 46.61 KG/M2 | HEART RATE: 76 BPM | DIASTOLIC BLOOD PRESSURE: 76 MMHG

## 2019-04-22 DIAGNOSIS — G43.109 MIGRAINE WITH AURA AND WITHOUT STATUS MIGRAINOSUS, NOT INTRACTABLE: ICD-10-CM

## 2019-04-22 PROCEDURE — G8427 DOCREV CUR MEDS BY ELIG CLIN: HCPCS | Performed by: PSYCHIATRY & NEUROLOGY

## 2019-04-22 PROCEDURE — 99214 OFFICE O/P EST MOD 30 MIN: CPT | Performed by: PSYCHIATRY & NEUROLOGY

## 2019-04-22 PROCEDURE — 3017F COLORECTAL CA SCREEN DOC REV: CPT | Performed by: PSYCHIATRY & NEUROLOGY

## 2019-04-22 PROCEDURE — G8417 CALC BMI ABV UP PARAM F/U: HCPCS | Performed by: PSYCHIATRY & NEUROLOGY

## 2019-04-22 PROCEDURE — 1036F TOBACCO NON-USER: CPT | Performed by: PSYCHIATRY & NEUROLOGY

## 2019-04-22 RX ORDER — CODEINE/BUTALBITAL/ASA/CAFFEIN 30-50-325
1 CAPSULE ORAL EVERY 6 HOURS PRN
Qty: 60 CAPSULE | Refills: 0 | Status: SHIPPED | OUTPATIENT
Start: 2019-04-22 | End: 2019-05-02 | Stop reason: SDUPTHER

## 2019-04-22 RX ORDER — VERAPAMIL HYDROCHLORIDE 240 MG/1
240 TABLET, FILM COATED, EXTENDED RELEASE ORAL NIGHTLY
Qty: 90 TABLET | Refills: 3 | Status: SHIPPED | OUTPATIENT
Start: 2019-04-22 | End: 2019-09-16 | Stop reason: SDUPTHER

## 2019-04-22 ASSESSMENT — ENCOUNTER SYMPTOMS
ALLERGIC/IMMUNOLOGIC NEGATIVE: 1
GASTROINTESTINAL NEGATIVE: 1
EYES NEGATIVE: 1
COUGH: 1

## 2019-04-22 NOTE — LETTER
Past Medical History:   Diagnosis Date    Anxiety     Gastroesophageal reflux disease     IBS (irritable bowel syndrome)     Migraine with aura     Torn meniscus     right knee       Past Surgical History:   Procedure Laterality Date     SECTION  1982    DILATION AND CURETTAGE OF UTERUS      with ablation    FOOT SURGERY      GALLBLADDER SURGERY  2001    HYSTERECTOMY  2008       Family History   Problem Relation Age of Onset    Heart Disease Mother     Heart Disease Father     Hypertension Father     Diabetes Father     Stroke Father        Social History     Socioeconomic History    Marital status:      Spouse name: None    Number of children: None    Years of education: None    Highest education level: None   Occupational History    None   Social Needs    Financial resource strain: None    Food insecurity:     Worry: None     Inability: None    Transportation needs:     Medical: None     Non-medical: None   Tobacco Use    Smoking status: Never Smoker    Smokeless tobacco: Never Used   Substance and Sexual Activity    Alcohol use: No     Alcohol/week: 0.0 oz    Drug use: No    Sexual activity: None   Lifestyle    Physical activity:     Days per week: None     Minutes per session: None    Stress: None   Relationships    Social connections:     Talks on phone: None     Gets together: None     Attends Yazidi service: None     Active member of club or organization: None     Attends meetings of clubs or organizations: None     Relationship status: None    Intimate partner violence:     Fear of current or ex partner: None     Emotionally abused: None     Physically abused: None     Forced sexual activity: None   Other Topics Concern    None   Social History Narrative    None       Current Outpatient Medications   Medication Sig Dispense Refill    butalbital-aspirin-caffeine-codeine (ASCOMP-CODEINE) -51-30 MG capsule Take 1 capsule by mouth every 6 hours as needed for Pain or Headaches. 60 capsule 0    verapamil (CALAN SR) 240 MG extended release tablet Take 1 tablet by mouth nightly 90 tablet 3    butalbital-acetaminophen-caffeine (FIORICET, ESGIC) -40 MG per tablet TAKE 1 TABLET BY MOUTH EVERY 4 HOURS AS NEEDED FOR HEADACHE 30 tablet 2    meperidine (DEMEROL) 50 MG tablet Take 1 tablet by mouth every 4 hours as needed for Pain . 20 tablet 0    promethazine (PHENERGAN) 25 MG tablet TAKE 1 TABLET EVERY 8 HOURS AS NEEDED FOR NAUSEA 90 tablet 0    omeprazole (PRILOSEC) 40 MG capsule Take 40 mg by mouth daily      Montelukast Sodium (SINGULAIR PO) Take by mouth daily      Levocetirizine Dihydrochloride (XYZAL PO) Take 1 tablet by mouth daily      estradiol (CLIMARA) 0.0375 MG/24HR Place 1 patch onto the skin once a week      POTASSIUM CHLORIDE PO Take  by mouth daily.  meloxicam (MOBIC) 15 MG tablet 1 tablet nightly.  LOVAZA 1 GM capsule 2 capsules 2 times daily.  Misc Natural Products (OSTEO BI-FLEX ADV DOUBLE ST PO) Take 2 capsules by mouth 2 times daily.  atorvastatin (LIPITOR) 80 MG tablet Take 40 mg by mouth daily       Multiple Vitamin (MULTIVITAMIN PO) Take  by mouth daily.  hydrochlorothiazide (HYDRODIURIL) 25 MG tablet Take 25 mg by mouth daily. No current facility-administered medications for this visit. Allergies   Allergen Reactions    Avelox [Moxifloxacin] Anaphylaxis    Dilaudid [Hydromorphone Hcl] Anaphylaxis    Erythromycin Hives    Fentanyl      insomnia    Morphine      anxiety    Nubain [Nalbuphine Hcl]      Flu symptoms    Penicillins Hives    Prochlorperazine Edisylate      Hallucinations    Sulfa Antibiotics Hives    Tigan [Trimethobenzamide-Benzocaine]      HYPERTENSION       Review of Systems   Constitutional: Negative. HENT: Positive for tinnitus. Eyes: Negative. Respiratory: Positive for cough. Cardiovascular: Negative. Gastrointestinal: Negative. Endocrine: Negative. Genitourinary: Negative. Musculoskeletal: Positive for arthralgias. Allergic/Immunologic: Negative. Neurological: Positive for headaches. Hematological: Negative. Psychiatric/Behavioral: Positive for dysphoric mood. The patient is nervous/anxious. Objective:   Physical Exam    Vitals:    04/22/19 0752   BP: 125/76   Pulse: 76     weight: 273 lb (123.8 kg)    Neurological Examination  Constitutional .General exam well groomed   Head/Ears /Nose/Throat: external ear . Normal exam  Neck and thyroid . Normal size. No bruits  Respiratory . Breathsounds clear bilaterally  Cardiovascular: Auscultation of heart with regular rate and rhythm  Musculoskeletal. Muscle bulk and tone normal                                                           Muscle strength 5/5 strength throughout                                                                                No dysmetria or dysdiadokinesis  No tremor   Normal fine motor  Gait normal   Orientation Alert and oriented x 3   Attention and concentration normal  Short term memory normal  Language process and speech normal . No aphasia   Cranial nerve 2 normal acuety and visual fields  Cranial nerve 3, 4 and 6 . Extraocular muscles are intact . Pupils are equal and reactive   Cranial nerve 5 . Normal strength of masseter and temporalis . Intact corneal reflex. Normal facial sensation  Cranial nerve 7 normal exam   Cranial nerve 8. Grossly intact hearing   Cranial nerve 9 and 10. Symmetric palate elevation   Cranial nerve 11 , 5 out of 5 strength   Cranial Nerve 12 midline tongue . No atrophy  Sensation . Normal proprioception . Intact Vibration . Normal pinprick and light touch   Deep Tendon Reflexes normal  Plantar response flexor bilaterally    Assessment:       Diagnosis Orders   1.  Migraine with aura and without status migrainosus, not intractable

## 2019-04-22 NOTE — COMMUNICATION BODY
Subjective:      Patient ID: Maurie Schilder is a 66 y.o. female. HPI    Active Problem migraine with aura on calan as migraine prophylactic although will try maxalt as initial abortive therap . The condition is she reports that her headaches are occuring one to twice per month . She tried maxalt as abortive finding that this did not work . Headaches will be behind right eye of throbbing stabbing pain grade 10 over 10 with nausea and vomiting . At headache onset she will take phenergan then fioricet . She reports that she has missed alot of work wit fioricet only taking the edge off needing to ride headache out for 2 to 3 days . Before she had step wise abortive therapy with initial fioricet and if headache is still there in 2 hours she wiolud use fiorinal with codeine and if still headache in 2 hours use demerol . Sara Mendoza She has not had ER visit for the past 5 years . She has tried imitrex , maxalt , cafergot , aleve and excedrin with no help . She has been also on inderal and depakote as prophylactic . There is no focal motor ,sensory,  bulbar or visual complaints . She reports that headaches are upon awakening from sleep with sinus pressure . There is no snoring with no daytime sleepiness. She has had 2 sleep studies the last 10 years ago with no sleep apnea  . Significant medications calan  mg po qhs , fioricet PRN , phenergan 25 mg PRN. Testing MRI of Head normal , 2014 . Carotid US  nonstenotic plaque .  Cardiac 2 D echo mild LVH EF 55-60 % , 2014      Past Medical History:   Diagnosis Date    Anxiety     Gastroesophageal reflux disease     IBS (irritable bowel syndrome)     Migraine with aura     Torn meniscus     right knee       Past Surgical History:   Procedure Laterality Date     SECTION      DILATION AND CURETTAGE OF UTERUS      with ablation    FOOT SURGERY      GALLBLADDER SURGERY      HYSTERECTOMY         Family History   Problem Relation Age of Onset    Heart Disease Mother     Heart Disease Father     Hypertension Father     Diabetes Father     Stroke Father        Social History     Socioeconomic History    Marital status:      Spouse name: None    Number of children: None    Years of education: None    Highest education level: None   Occupational History    None   Social Needs    Financial resource strain: None    Food insecurity:     Worry: None     Inability: None    Transportation needs:     Medical: None     Non-medical: None   Tobacco Use    Smoking status: Never Smoker    Smokeless tobacco: Never Used   Substance and Sexual Activity    Alcohol use: No     Alcohol/week: 0.0 oz    Drug use: No    Sexual activity: None   Lifestyle    Physical activity:     Days per week: None     Minutes per session: None    Stress: None   Relationships    Social connections:     Talks on phone: None     Gets together: None     Attends Yarsani service: None     Active member of club or organization: None     Attends meetings of clubs or organizations: None     Relationship status: None    Intimate partner violence:     Fear of current or ex partner: None     Emotionally abused: None     Physically abused: None     Forced sexual activity: None   Other Topics Concern    None   Social History Narrative    None       Current Outpatient Medications   Medication Sig Dispense Refill    butalbital-aspirin-caffeine-codeine (ASCOMP-CODEINE) -55-30 MG capsule Take 1 capsule by mouth every 6 hours as needed for Pain or Headaches. 60 capsule 0    verapamil (CALAN SR) 240 MG extended release tablet Take 1 tablet by mouth nightly 90 tablet 3    butalbital-acetaminophen-caffeine (FIORICET, ESGIC) -40 MG per tablet TAKE 1 TABLET BY MOUTH EVERY 4 HOURS AS NEEDED FOR HEADACHE 30 tablet 2    meperidine (DEMEROL) 50 MG tablet Take 1 tablet by mouth every 4 hours as needed for Pain .  20 tablet 0    promethazine (PHENERGAN) 25 MG tablet TAKE 1 TABLET EVERY 8 HOURS AS NEEDED FOR NAUSEA 90 tablet 0    omeprazole (PRILOSEC) 40 MG capsule Take 40 mg by mouth daily      Montelukast Sodium (SINGULAIR PO) Take by mouth daily      Levocetirizine Dihydrochloride (XYZAL PO) Take 1 tablet by mouth daily      estradiol (CLIMARA) 0.0375 MG/24HR Place 1 patch onto the skin once a week      POTASSIUM CHLORIDE PO Take  by mouth daily.  meloxicam (MOBIC) 15 MG tablet 1 tablet nightly.  LOVAZA 1 GM capsule 2 capsules 2 times daily.  Misc Natural Products (OSTEO BI-FLEX ADV DOUBLE ST PO) Take 2 capsules by mouth 2 times daily.  atorvastatin (LIPITOR) 80 MG tablet Take 40 mg by mouth daily       Multiple Vitamin (MULTIVITAMIN PO) Take  by mouth daily.  hydrochlorothiazide (HYDRODIURIL) 25 MG tablet Take 25 mg by mouth daily. No current facility-administered medications for this visit. Allergies   Allergen Reactions    Avelox [Moxifloxacin] Anaphylaxis    Dilaudid [Hydromorphone Hcl] Anaphylaxis    Erythromycin Hives    Fentanyl      insomnia    Morphine      anxiety    Nubain [Nalbuphine Hcl]      Flu symptoms    Penicillins Hives    Prochlorperazine Edisylate      Hallucinations    Sulfa Antibiotics Hives    Tigan [Trimethobenzamide-Benzocaine]      HYPERTENSION       Review of Systems   Constitutional: Negative. HENT: Positive for tinnitus. Eyes: Negative. Respiratory: Positive for cough. Cardiovascular: Negative. Gastrointestinal: Negative. Endocrine: Negative. Genitourinary: Negative. Musculoskeletal: Positive for arthralgias. Allergic/Immunologic: Negative. Neurological: Positive for headaches. Hematological: Negative. Psychiatric/Behavioral: Positive for dysphoric mood. The patient is nervous/anxious.         Objective:   Physical Exam    Vitals:    04/22/19 0752   BP: 125/76   Pulse: 76     weight: 273 lb (123.8 kg)    Neurological Examination  Constitutional .General exam well groomed   Head/Ears /Nose/Throat: external ear . Normal exam  Neck and thyroid . Normal size. No bruits  Respiratory . Breathsounds clear bilaterally  Cardiovascular: Auscultation of heart with regular rate and rhythm  Musculoskeletal. Muscle bulk and tone normal                                                           Muscle strength 5/5 strength throughout                                                                                No dysmetria or dysdiadokinesis  No tremor   Normal fine motor  Gait normal   Orientation Alert and oriented x 3   Attention and concentration normal  Short term memory normal  Language process and speech normal . No aphasia   Cranial nerve 2 normal acuety and visual fields  Cranial nerve 3, 4 and 6 . Extraocular muscles are intact . Pupils are equal and reactive   Cranial nerve 5 . Normal strength of masseter and temporalis . Intact corneal reflex. Normal facial sensation  Cranial nerve 7 normal exam   Cranial nerve 8. Grossly intact hearing   Cranial nerve 9 and 10. Symmetric palate elevation   Cranial nerve 11 , 5 out of 5 strength   Cranial Nerve 12 midline tongue . No atrophy  Sensation . Normal proprioception . Intact Vibration . Normal pinprick and light touch   Deep Tendon Reflexes normal  Plantar response flexor bilaterally    Assessment:       Diagnosis Orders   1.  Migraine with aura and without status migrainosus, not intractable  butalbital-aspirin-caffeine-codeine (ASCOMP-CODEINE) -25-30 MG capsule    verapamil (CALAN SR) 240 MG extended release tablet   Will have patient us fiorinol with codeine as backup to fioricet otherwise leaving medication regimen unchanged       Plan:      As above

## 2019-04-22 NOTE — PROGRESS NOTES
EVERY 8 HOURS AS NEEDED FOR NAUSEA 90 tablet 0    omeprazole (PRILOSEC) 40 MG capsule Take 40 mg by mouth daily      Montelukast Sodium (SINGULAIR PO) Take by mouth daily      Levocetirizine Dihydrochloride (XYZAL PO) Take 1 tablet by mouth daily      estradiol (CLIMARA) 0.0375 MG/24HR Place 1 patch onto the skin once a week      POTASSIUM CHLORIDE PO Take  by mouth daily.  meloxicam (MOBIC) 15 MG tablet 1 tablet nightly.  LOVAZA 1 GM capsule 2 capsules 2 times daily.  Misc Natural Products (OSTEO BI-FLEX ADV DOUBLE ST PO) Take 2 capsules by mouth 2 times daily.  atorvastatin (LIPITOR) 80 MG tablet Take 40 mg by mouth daily       Multiple Vitamin (MULTIVITAMIN PO) Take  by mouth daily.  hydrochlorothiazide (HYDRODIURIL) 25 MG tablet Take 25 mg by mouth daily. No current facility-administered medications for this visit. Allergies   Allergen Reactions    Avelox [Moxifloxacin] Anaphylaxis    Dilaudid [Hydromorphone Hcl] Anaphylaxis    Erythromycin Hives    Fentanyl      insomnia    Morphine      anxiety    Nubain [Nalbuphine Hcl]      Flu symptoms    Penicillins Hives    Prochlorperazine Edisylate      Hallucinations    Sulfa Antibiotics Hives    Tigan [Trimethobenzamide-Benzocaine]      HYPERTENSION       Review of Systems   Constitutional: Negative. HENT: Positive for tinnitus. Eyes: Negative. Respiratory: Positive for cough. Cardiovascular: Negative. Gastrointestinal: Negative. Endocrine: Negative. Genitourinary: Negative. Musculoskeletal: Positive for arthralgias. Allergic/Immunologic: Negative. Neurological: Positive for headaches. Hematological: Negative. Psychiatric/Behavioral: Positive for dysphoric mood. The patient is nervous/anxious.         Objective:   Physical Exam    Vitals:    04/22/19 0752   BP: 125/76   Pulse: 76     weight: 273 lb (123.8 kg)    Neurological Examination  Constitutional .General exam well groomed   Head/Ears /Nose/Throat: external ear . Normal exam  Neck and thyroid . Normal size. No bruits  Respiratory . Breathsounds clear bilaterally  Cardiovascular: Auscultation of heart with regular rate and rhythm  Musculoskeletal. Muscle bulk and tone normal                                                           Muscle strength 5/5 strength throughout                                                                                No dysmetria or dysdiadokinesis  No tremor   Normal fine motor  Gait normal   Orientation Alert and oriented x 3   Attention and concentration normal  Short term memory normal  Language process and speech normal . No aphasia   Cranial nerve 2 normal acuety and visual fields  Cranial nerve 3, 4 and 6 . Extraocular muscles are intact . Pupils are equal and reactive   Cranial nerve 5 . Normal strength of masseter and temporalis . Intact corneal reflex. Normal facial sensation  Cranial nerve 7 normal exam   Cranial nerve 8. Grossly intact hearing   Cranial nerve 9 and 10. Symmetric palate elevation   Cranial nerve 11 , 5 out of 5 strength   Cranial Nerve 12 midline tongue . No atrophy  Sensation . Normal proprioception . Intact Vibration . Normal pinprick and light touch   Deep Tendon Reflexes normal  Plantar response flexor bilaterally    Assessment:       Diagnosis Orders   1.  Migraine with aura and without status migrainosus, not intractable  butalbital-aspirin-caffeine-codeine (ASCOMP-CODEINE) -10-30 MG capsule    verapamil (CALAN SR) 240 MG extended release tablet   Will have patient us fiorinol with codeine as backup to fioricet otherwise leaving medication regimen unchanged       Plan:      As above

## 2019-04-23 ENCOUNTER — TELEPHONE (OUTPATIENT)
Dept: NEUROLOGY | Age: 59
End: 2019-04-23

## 2019-04-23 NOTE — TELEPHONE ENCOUNTER
Cherise Adams called in. She left a message that Fiorinal with codeine needs PA and [facility] had sent something over. She does have a migraine and is hoping this can be done soon.

## 2019-05-01 ENCOUNTER — TELEPHONE (OUTPATIENT)
Dept: NEUROLOGY | Age: 59
End: 2019-05-01

## 2019-05-01 NOTE — TELEPHONE ENCOUNTER
[place] denied the Fiorinal with codeine. I did review her care with Dr. Renato Grubbs and he said he does not want to make any change in the quantity or pursue any other appeal with insurance. He asked that I inform her that ins has denied it. She has the option of paying cash if that is what she wants to do but he does not want to pursue anything further with insurance. I called [facility] and explained this to her and she voiced understanding.

## 2019-05-02 DIAGNOSIS — G43.109 MIGRAINE WITH AURA AND WITHOUT STATUS MIGRAINOSUS, NOT INTRACTABLE: ICD-10-CM

## 2019-05-02 RX ORDER — CODEINE/BUTALBITAL/ASA/CAFFEIN 30-50-325
1 CAPSULE ORAL EVERY 6 HOURS PRN
Qty: 60 CAPSULE | Refills: 0 | Status: SHIPPED | OUTPATIENT
Start: 2019-05-02 | End: 2019-05-16 | Stop reason: SDUPTHER

## 2019-05-02 NOTE — TELEPHONE ENCOUNTER
Cassi Edouard called asking that we reissue her her butalbital-aspirin-caffeine-codeine prescription to [facility]. Her insurance will not cover it. She is going to pay out of pocket for it using the [facility] Card. She said that [facility] cost is twice as much [facility]. I called [facility] and cancelled the prescription they received. I spoke with Jake [place].

## 2019-05-15 NOTE — TELEPHONE ENCOUNTER
Called patient and informed him that based on the recent large study, he probably shouldn't take aspirin as it was not shown to be beneficial in his circumstance. He understood and aspirin D/C   When will she be back in [place]?

## 2019-05-16 DIAGNOSIS — G43.109 MIGRAINE WITH AURA AND WITHOUT STATUS MIGRAINOSUS, NOT INTRACTABLE: ICD-10-CM

## 2019-05-17 RX ORDER — CODEINE/BUTALBITAL/ASA/CAFFEIN 30-50-325
1 CAPSULE ORAL EVERY 6 HOURS PRN
Qty: 60 CAPSULE | Refills: 0 | Status: SHIPPED | OUTPATIENT
Start: 2019-05-17 | End: 2019-08-08 | Stop reason: SDUPTHER

## 2019-08-08 DIAGNOSIS — G43.109 MIGRAINE WITH AURA AND WITHOUT STATUS MIGRAINOSUS, NOT INTRACTABLE: ICD-10-CM

## 2019-08-08 NOTE — TELEPHONE ENCOUNTER
Patient is calling for their Fiorinal with Codeine prescription. [facility] report was done today and viewed by Dr. Bud Dick. Rx will be sent electronically by physician to the pharmacy.

## 2019-08-08 NOTE — TELEPHONE ENCOUNTER
Ana Cristina's Fiorinal with Codeine prescription needs to be reissued. It went through without any dosing directions. Patient is calling for their Fiorinal with Codeine prescription. [facility] report was done today and viewed by Dr. Víctor Spence. Rx will be sent electronically by physician to the pharmacy.

## 2019-08-09 RX ORDER — CODEINE/BUTALBITAL/ASA/CAFFEIN 30-50-325
1 CAPSULE ORAL EVERY 6 HOURS PRN
Qty: 60 CAPSULE | Refills: 0 | Status: SHIPPED | OUTPATIENT
Start: 2019-08-09 | End: 2019-09-08

## 2019-09-04 ENCOUNTER — TELEPHONE (OUTPATIENT)
Dept: NEUROLOGY | Age: 59
End: 2019-09-04

## 2019-09-16 DIAGNOSIS — G43.109 MIGRAINE WITH AURA AND WITHOUT STATUS MIGRAINOSUS, NOT INTRACTABLE: ICD-10-CM

## 2019-09-16 RX ORDER — VERAPAMIL HYDROCHLORIDE 240 MG/1
TABLET, FILM COATED, EXTENDED RELEASE ORAL
Qty: 90 TABLET | Refills: 1 | Status: SHIPPED | OUTPATIENT
Start: 2019-09-16 | End: 2020-05-26 | Stop reason: SDUPTHER

## 2019-10-21 ENCOUNTER — OFFICE VISIT (OUTPATIENT)
Dept: NEUROLOGY | Age: 59
End: 2019-10-21
Payer: COMMERCIAL

## 2019-10-21 VITALS
BODY MASS INDEX: 44.22 KG/M2 | HEIGHT: 64 IN | DIASTOLIC BLOOD PRESSURE: 65 MMHG | WEIGHT: 259 LBS | SYSTOLIC BLOOD PRESSURE: 124 MMHG | HEART RATE: 82 BPM

## 2019-10-21 DIAGNOSIS — G43.109 MIGRAINE WITH AURA AND WITHOUT STATUS MIGRAINOSUS, NOT INTRACTABLE: Primary | ICD-10-CM

## 2019-10-21 PROCEDURE — 1036F TOBACCO NON-USER: CPT | Performed by: PSYCHIATRY & NEUROLOGY

## 2019-10-21 PROCEDURE — G8417 CALC BMI ABV UP PARAM F/U: HCPCS | Performed by: PSYCHIATRY & NEUROLOGY

## 2019-10-21 PROCEDURE — G8427 DOCREV CUR MEDS BY ELIG CLIN: HCPCS | Performed by: PSYCHIATRY & NEUROLOGY

## 2019-10-21 PROCEDURE — 99214 OFFICE O/P EST MOD 30 MIN: CPT | Performed by: PSYCHIATRY & NEUROLOGY

## 2019-10-21 PROCEDURE — 3017F COLORECTAL CA SCREEN DOC REV: CPT | Performed by: PSYCHIATRY & NEUROLOGY

## 2019-10-21 PROCEDURE — G8484 FLU IMMUNIZE NO ADMIN: HCPCS | Performed by: PSYCHIATRY & NEUROLOGY

## 2019-10-21 RX ORDER — ATORVASTATIN CALCIUM 40 MG/1
80 TABLET, FILM COATED ORAL DAILY
COMMUNITY

## 2019-10-21 ASSESSMENT — ENCOUNTER SYMPTOMS
EYES NEGATIVE: 1
GASTROINTESTINAL NEGATIVE: 1
RESPIRATORY NEGATIVE: 1
ALLERGIC/IMMUNOLOGIC NEGATIVE: 1

## 2020-05-22 RX ORDER — ASCORBIC ACID 500 MG
500 TABLET ORAL 2 TIMES DAILY
COMMUNITY

## 2020-05-22 RX ORDER — OLOPATADINE HYDROCHLORIDE 1 MG/ML
1 SOLUTION/ DROPS OPHTHALMIC 2 TIMES DAILY
COMMUNITY

## 2020-05-22 RX ORDER — FLUTICASONE PROPIONATE 50 MCG
1 SPRAY, SUSPENSION (ML) NASAL DAILY
COMMUNITY

## 2020-05-26 ENCOUNTER — TELEMEDICINE (OUTPATIENT)
Dept: NEUROLOGY | Age: 60
End: 2020-05-26
Payer: COMMERCIAL

## 2020-05-26 PROCEDURE — G8417 CALC BMI ABV UP PARAM F/U: HCPCS | Performed by: PSYCHIATRY & NEUROLOGY

## 2020-05-26 PROCEDURE — G8427 DOCREV CUR MEDS BY ELIG CLIN: HCPCS | Performed by: PSYCHIATRY & NEUROLOGY

## 2020-05-26 PROCEDURE — 1036F TOBACCO NON-USER: CPT | Performed by: PSYCHIATRY & NEUROLOGY

## 2020-05-26 PROCEDURE — 3017F COLORECTAL CA SCREEN DOC REV: CPT | Performed by: PSYCHIATRY & NEUROLOGY

## 2020-05-26 PROCEDURE — 99214 OFFICE O/P EST MOD 30 MIN: CPT | Performed by: PSYCHIATRY & NEUROLOGY

## 2020-05-26 RX ORDER — VERAPAMIL HYDROCHLORIDE 240 MG/1
TABLET, FILM COATED, EXTENDED RELEASE ORAL
Qty: 90 TABLET | Refills: 3 | Status: SHIPPED | OUTPATIENT
Start: 2020-05-26 | End: 2021-05-17

## 2020-05-26 RX ORDER — BUTALBITAL, ACETAMINOPHEN AND CAFFEINE 50; 325; 40 MG/1; MG/1; MG/1
TABLET ORAL
Qty: 60 TABLET | Refills: 1 | Status: SHIPPED | OUTPATIENT
Start: 2020-05-26 | End: 2022-01-27 | Stop reason: SDUPTHER

## 2020-05-26 RX ORDER — PROMETHAZINE HYDROCHLORIDE 25 MG/1
TABLET ORAL
Qty: 90 TABLET | Refills: 1 | Status: SHIPPED | OUTPATIENT
Start: 2020-05-26 | End: 2022-01-27 | Stop reason: SDUPTHER

## 2020-05-26 ASSESSMENT — ENCOUNTER SYMPTOMS
EYES NEGATIVE: 1
BACK PAIN: 1
ALLERGIC/IMMUNOLOGIC NEGATIVE: 1
GASTROINTESTINAL NEGATIVE: 1
RESPIRATORY NEGATIVE: 1

## 2020-05-26 NOTE — PROGRESS NOTES
Auscultation of heart with regular rate and rhythm  Musculoskeletal. Muscle bulk and tone normal                                                           Muscle strength 5/5 strength throughout                                                                                No dysmetria or dysdiadokinesis  No tremor   Normal fine motor  Gait normal   Orientation Alert and oriented x 3   Attention and concentration normal  Short term memory normal  Language process and speech normal . No aphasia   Cranial nerve 2 normal acuety and visual fields  Cranial nerve 3, 4 and 6 . Extraocular muscles are intact . Pupils are equal and reactive   Cranial nerve 5 . Normal strength of masseter and temporalis . Intact corneal reflex. Normal facial sensation  Cranial nerve 7 normal exam   Cranial nerve 8. Grossly intact hearing   Cranial nerve 9 and 10. Symmetric palate elevation   Cranial nerve 11 , 5 out of 5 strength   Cranial Nerve 12 midline tongue . No atrophy  Sensation . Normal proprioception . Intact Vibration . Normal pinprick and light touch   Deep Tendon Reflexes normal  Plantar response flexor bilaterally    Assessment:       Diagnosis Orders   1. Migraine with aura and without status migrainosus, not intractable  verapamil (CALAN SR) 240 MG extended release tablet   She is to continue current regimen     Plan:      As above       Zina Teresa is a 64 y.o. female being evaluated by a Virtual Visit (video visit) encounter to address concerns as mentioned above. A caregiver was present when appropriate. Due to this being a TeleHealth encounter (During ZVXGB-96 public health emergency), evaluation of the following organ systems was limited: Vitals/Constitutional/EENT/Resp/CV/GI//MS/Neuro/Skin/Heme-Lymph-Imm.   Pursuant to the emergency declaration under the [facility], [address] [place] [address] authority and the [employer] Act, this Virtual Visit

## 2021-01-22 ENCOUNTER — OFFICE VISIT (OUTPATIENT)
Dept: NEUROLOGY | Age: 61
End: 2021-01-22
Payer: COMMERCIAL

## 2021-01-22 VITALS
HEIGHT: 64 IN | DIASTOLIC BLOOD PRESSURE: 75 MMHG | HEART RATE: 75 BPM | WEIGHT: 283 LBS | BODY MASS INDEX: 48.32 KG/M2 | TEMPERATURE: 97.3 F | SYSTOLIC BLOOD PRESSURE: 130 MMHG

## 2021-01-22 DIAGNOSIS — G43.109 MIGRAINE WITH AURA AND WITHOUT STATUS MIGRAINOSUS, NOT INTRACTABLE: Primary | ICD-10-CM

## 2021-01-22 PROCEDURE — 1036F TOBACCO NON-USER: CPT | Performed by: PSYCHIATRY & NEUROLOGY

## 2021-01-22 PROCEDURE — 99214 OFFICE O/P EST MOD 30 MIN: CPT | Performed by: PSYCHIATRY & NEUROLOGY

## 2021-01-22 PROCEDURE — G8417 CALC BMI ABV UP PARAM F/U: HCPCS | Performed by: PSYCHIATRY & NEUROLOGY

## 2021-01-22 PROCEDURE — 3017F COLORECTAL CA SCREEN DOC REV: CPT | Performed by: PSYCHIATRY & NEUROLOGY

## 2021-01-22 PROCEDURE — G8484 FLU IMMUNIZE NO ADMIN: HCPCS | Performed by: PSYCHIATRY & NEUROLOGY

## 2021-01-22 PROCEDURE — G8427 DOCREV CUR MEDS BY ELIG CLIN: HCPCS | Performed by: PSYCHIATRY & NEUROLOGY

## 2021-01-22 RX ORDER — FUROSEMIDE 40 MG/1
40 TABLET ORAL DAILY
COMMUNITY

## 2021-01-22 ASSESSMENT — ENCOUNTER SYMPTOMS
ALLERGIC/IMMUNOLOGIC NEGATIVE: 1
GASTROINTESTINAL NEGATIVE: 1
BACK PAIN: 1
EYES NEGATIVE: 1
RESPIRATORY NEGATIVE: 1

## 2021-01-22 NOTE — PROGRESS NOTES
Subjective:      Patient ID: Milena Torres is a 64 y.o. female. HPI  Active Problem migraine with aura on calan . The condition is she is having headaches one to two per month with good headache reporting that headaches are definitely less frequent and less intense on this medication tolerating this well . Headaches are behind right eye of throbbing component grade 6 over 10 attenuated with fioricet and phenergan . She has been previously on fioricet with codeine having tried also imitrex , maxalt , cafergot , aleve and excedrin with no help . She has been also on topamax, inderal and depakote as prophylactic . There is no focal motor ,sensory,  bulbar or visual complaints . She has had 2 sleep studies with no sleep apnea . Significant medications calan  mg po qhs , fioricet PRN , phenergan 25 mg PRN, fioricet with codeine PRN . Testing MRI of Head normal , 2014 . Carotid US  nonstenotic plaque .  Cardiac 2 D echo mild LVH EF 55-60 % , 2014      Past Medical History:   Diagnosis Date    Anxiety     Gastroesophageal reflux disease     IBS (irritable bowel syndrome)     Migraine with aura     Torn meniscus     right knee       Past Surgical History:   Procedure Laterality Date     SECTION      DILATION AND CURETTAGE OF UTERUS      with ablation    FOOT SURGERY      GALLBLADDER SURGERY      HYSTERECTOMY         Family History   Problem Relation Age of Onset    Heart Disease Mother     Heart Disease Father     Hypertension Father     Diabetes Father     Stroke Father        Social History     Socioeconomic History    Marital status:      Spouse name: None    Number of children: None    Years of education: None    Highest education level: None   Occupational History    None   Social Needs    Financial resource strain: None    Food insecurity     Worry: None     Inability: None    Transportation needs     Medical: None     Non-medical: None   Tobacco Use    Smoking status: Never Smoker    Smokeless tobacco: Never Used   Substance and Sexual Activity    Alcohol use: No     Alcohol/week: 0.0 standard drinks    Drug use: No    Sexual activity: None   Lifestyle    Physical activity     Days per week: None     Minutes per session: None    Stress: None   Relationships    Social connections     Talks on phone: None     Gets together: None     Attends [employer] service: None     Active member of club or organization: None     Attends meetings of clubs or organizations: None     Relationship status: None    Intimate partner violence     Fear of current or ex partner: None     Emotionally abused: None     Physically abused: None     Forced sexual activity: None   Other Topics Concern    None   Social History Narrative    None       Current Outpatient Medications   Medication Sig Dispense Refill    furosemide (LASIX) 40 MG tablet Take 40 mg by mouth three times a week      verapamil (CALAN SR) 240 MG extended release tablet Take 1 po qhs 90 tablet 3    butalbital-acetaminophen-caffeine (FIORICET, ESGIC) -40 MG per tablet TAKE 1 TABLET BY MOUTH EVERY 4 HOURS AS NEEDED FOR HEADACHE 60 tablet 1    promethazine (PHENERGAN) 25 MG tablet TAKE 1 TABLET EVERY 8 HOURS AS NEEDED FOR NAUSEA 90 tablet 1    Cholecalciferol (D3 VITAMIN PO) Take by mouth daily      vitamin C (ASCORBIC ACID) 500 MG tablet Take 500 mg by mouth 2 times daily       Coenzyme Q10 (CO Q 10 PO) Take by mouth daily      fluticasone (FLONASE) 50 MCG/ACT nasal spray 1 spray by Each Nostril route daily      olopatadine (PATANOL) 0.1 % ophthalmic solution Place 1 drop into both eyes 2 times daily      atorvastatin (LIPITOR) 40 MG tablet Take 40 mg by mouth daily      Omega-3 Fatty Acids (OMEGA 3 PO) Take by mouth daily      NONFORMULARY Indications: allgery shot twice a week       omeprazole (PRILOSEC) 40 MG capsule Take 40 mg by mouth daily      Montelukast Sodium (SINGULAIR PO) Take by mouth daily      Levocetirizine Dihydrochloride (XYZAL PO) Take 1 tablet by mouth daily      estradiol (CLIMARA) 0.0375 MG/24HR Place 1 patch onto the skin Every 4 days.  POTASSIUM CHLORIDE PO Take  by mouth daily.  meloxicam (MOBIC) 15 MG tablet 1 tablet nightly.  Misc Natural Products (OSTEO BI-FLEX ADV DOUBLE ST PO) Take 2 capsules by mouth 2 times daily.  Multiple Vitamin (MULTIVITAMIN PO) Take  by mouth daily.  hydrochlorothiazide (HYDRODIURIL) 25 MG tablet Take 25 mg by mouth daily. No current facility-administered medications for this visit. Allergies   Allergen Reactions    Avelox [Moxifloxacin] Anaphylaxis    Dilaudid [Hydromorphone Hcl] Anaphylaxis    Erythromycin Hives    Fentanyl      insomnia    Morphine      anxiety    Nubain [Nalbuphine Hcl]      Flu symptoms    Penicillins Hives    Prochlorperazine Edisylate      Hallucinations    Sulfa Antibiotics Hives    Tigan [Trimethobenzamide-Benzocaine]      HYPERTENSION       Review of Systems   Constitutional: Negative. HENT: Positive for tinnitus. Eyes: Negative. Respiratory: Negative. Cardiovascular: Negative. Gastrointestinal: Negative. Endocrine: Negative. Genitourinary: Negative. Musculoskeletal: Positive for arthralgias, back pain and myalgias. Allergic/Immunologic: Negative. Neurological: Positive for speech difficulty and headaches. Hematological: Negative. Psychiatric/Behavioral: The patient is nervous/anxious. Objective:   Physical Exam  Vitals:    01/22/21 1038   BP: 130/75   Pulse: 75   Temp: 97.3 °F (36.3 °C)     weight: 283 lb (128.4 kg)    Neurological Examination  Constitutional .General exam well groomed   Head/Ears /Nose/Throat: external ear . Normal exam  Neck and thyroid . Normal size. No bruits  Respiratory . Breathsounds clear bilaterally  Cardiovascular:  Auscultation of heart with regular rate and rhythm  Musculoskeletal. Muscle bulk and

## 2021-05-17 DIAGNOSIS — G43.109 MIGRAINE WITH AURA AND WITHOUT STATUS MIGRAINOSUS, NOT INTRACTABLE: ICD-10-CM

## 2021-05-17 RX ORDER — VERAPAMIL HYDROCHLORIDE 240 MG/1
TABLET, FILM COATED, EXTENDED RELEASE ORAL
Qty: 90 TABLET | Refills: 2 | Status: SHIPPED | OUTPATIENT
Start: 2021-05-17 | End: 2022-01-27 | Stop reason: SDUPTHER

## 2021-05-17 NOTE — TELEPHONE ENCOUNTER
Pharmacy requesting a  refill of Verapamil 240mg .       Medication active on med list yes      Date of last prescription 05/26/2020  with 3 refills verified on 05/17/2021    verified by SARKIS FUENTES      Date of last appointment 01/22/2021    Next Visit Date:  Visit date not found

## 2022-01-27 ENCOUNTER — OFFICE VISIT (OUTPATIENT)
Dept: NEUROLOGY | Age: 62
End: 2022-01-27
Payer: COMMERCIAL

## 2022-01-27 VITALS
BODY MASS INDEX: 48.83 KG/M2 | HEART RATE: 85 BPM | SYSTOLIC BLOOD PRESSURE: 165 MMHG | WEIGHT: 286 LBS | TEMPERATURE: 97.3 F | HEIGHT: 64 IN | DIASTOLIC BLOOD PRESSURE: 88 MMHG

## 2022-01-27 DIAGNOSIS — G43.109 MIGRAINE WITH AURA AND WITHOUT STATUS MIGRAINOSUS, NOT INTRACTABLE: Primary | ICD-10-CM

## 2022-01-27 DIAGNOSIS — E66.01 MORBID OBESITY (HCC): ICD-10-CM

## 2022-01-27 PROCEDURE — 1036F TOBACCO NON-USER: CPT | Performed by: PSYCHIATRY & NEUROLOGY

## 2022-01-27 PROCEDURE — G8417 CALC BMI ABV UP PARAM F/U: HCPCS | Performed by: PSYCHIATRY & NEUROLOGY

## 2022-01-27 PROCEDURE — G8484 FLU IMMUNIZE NO ADMIN: HCPCS | Performed by: PSYCHIATRY & NEUROLOGY

## 2022-01-27 PROCEDURE — G8427 DOCREV CUR MEDS BY ELIG CLIN: HCPCS | Performed by: PSYCHIATRY & NEUROLOGY

## 2022-01-27 PROCEDURE — 3017F COLORECTAL CA SCREEN DOC REV: CPT | Performed by: PSYCHIATRY & NEUROLOGY

## 2022-01-27 PROCEDURE — 99214 OFFICE O/P EST MOD 30 MIN: CPT | Performed by: PSYCHIATRY & NEUROLOGY

## 2022-01-27 RX ORDER — VERAPAMIL HYDROCHLORIDE 240 MG/1
TABLET, FILM COATED, EXTENDED RELEASE ORAL
Qty: 90 TABLET | Refills: 3 | Status: SHIPPED | OUTPATIENT
Start: 2022-01-27

## 2022-01-27 RX ORDER — BUTALBITAL, ACETAMINOPHEN AND CAFFEINE 50; 325; 40 MG/1; MG/1; MG/1
TABLET ORAL
Qty: 60 TABLET | Refills: 1 | Status: SHIPPED | OUTPATIENT
Start: 2022-01-27

## 2022-01-27 RX ORDER — PROMETHAZINE HYDROCHLORIDE 25 MG/1
TABLET ORAL
Qty: 90 TABLET | Refills: 1 | Status: SHIPPED | OUTPATIENT
Start: 2022-01-27 | End: 2022-06-02

## 2022-01-27 ASSESSMENT — ENCOUNTER SYMPTOMS
BACK PAIN: 1
EYES NEGATIVE: 1
RESPIRATORY NEGATIVE: 1
GASTROINTESTINAL NEGATIVE: 1
ALLERGIC/IMMUNOLOGIC NEGATIVE: 1

## 2022-01-27 NOTE — PROGRESS NOTES
Subjective:      Patient ID: Serena Mclaughlin is a 63 y.o. female. HPI  Active Problem migraine with aura on calan . The condition is she is having headaches one to two per month behind right eye of pressure throbbing of grade 8 over 10 with nausea and light sensitivity attenuated with fioricet and phenergan . She has been previously on fioricet with codeine having tried also imitrex , maxalt , cafergot , aleve and excedrin with no help . She has been also on topamax, inderal and depakote as prophylactic . There is no focal motor ,sensory,  bulbar or visual complaints . She has had 2 sleep studies with no sleep apnea . There is obesity on diet having lost 10 lbs over the last 1 1/2 month to current 286 lb weight , BMI 49 . Significant medications calan  mg po qhs , fioricet PRN , phenergan 25 mg PRN, fioricet with codeine PRN . Testing MRI of Head normal , 2014 . Carotid US  nonstenotic plaque .  Cardiac 2 D echo mild LVH EF 55-60 % , 2014      Past Medical History:   Diagnosis Date    Anxiety     Gastroesophageal reflux disease     IBS (irritable bowel syndrome)     Migraine with aura     Torn meniscus     right knee       Past Surgical History:   Procedure Laterality Date     SECTION      DILATION AND CURETTAGE OF UTERUS      with ablation    FOOT SURGERY      GALLBLADDER SURGERY  2001    HYSTERECTOMY  2008       Family History   Problem Relation Age of Onset    Heart Disease Mother     Heart Disease Father     Hypertension Father     Diabetes Father     Stroke Father        Social History     Socioeconomic History    Marital status:      Spouse name: Not on file    Number of children: Not on file    Years of education: Not on file    Highest education level: Not on file   Occupational History    Not on file   Tobacco Use    Smoking status: Never Smoker    Smokeless tobacco: Never Used   Vaping Use    Vaping Use: Never used   Substance and Sexual Activity  Alcohol use: No     Alcohol/week: 0.0 standard drinks    Drug use: No    Sexual activity: Not on file   Other Topics Concern    Not on file   Social History Narrative    Not on file     Social Determinants of Health     Financial Resource Strain:     Difficulty of Paying Living Expenses: Not on file   Food Insecurity:     Worried About Running Out of Food in the Last Year: Not on file    Chandana of Food in the Last Year: Not on file   Transportation Needs:     Lack of Transportation (Medical): Not on file    Lack of Transportation (Non-Medical):  Not on file   Physical Activity:     Days of Exercise per Week: Not on file    Minutes of Exercise per Session: Not on file   Stress:     Feeling of Stress : Not on file   Social Connections:     Frequency of Communication with Friends and Family: Not on file    Frequency of Social Gatherings with Friends and Family: Not on file    Attends Den[employer]ational Services: Not on file    Active Member of Clubs or Organizations: Not on file    Attends Club or Organization Meetings: Not on file    Marital Status: Not on file   Intimate Partner Violence:     Fear of Current or Ex-Partner: Not on file    Emotionally Abused: Not on file    Physically Abused: Not on file    Sexually Abused: Not on file   Housing Stability:     Unable to Pay for Housing in the Last Year: Not on file    Number of Places Lived in the Last Year: Not on file    Unstable Housing in the Last Year: Not on file       Current Outpatient Medications   Medication Sig Dispense Refill    butalbital-acetaminophen-caffeine (FIORICET, ESGIC) -40 MG per tablet TAKE 1 TABLET BY MOUTH EVERY 4 HOURS AS NEEDED FOR HEADACHE 60 tablet 1    promethazine (PHENERGAN) 25 MG tablet TAKE 1 TABLET EVERY 8 HOURS AS NEEDED FOR NAUSEA 90 tablet 1    verapamil (CALAN SR) 240 MG extended release tablet TAKE ONE TABLET BY MOUTH EVERY NIGHT AT BEDTIME 90 tablet 3    furosemide (LASIX) 40 MG tablet Take 40 mg by mouth daily       Cholecalciferol (D3 VITAMIN PO) Take by mouth daily      vitamin C (ASCORBIC ACID) 500 MG tablet Take 500 mg by mouth 2 times daily       Coenzyme Q10 (CO Q 10 PO) Take by mouth daily      fluticasone (FLONASE) 50 MCG/ACT nasal spray 1 spray by Each Nostril route daily      olopatadine (PATANOL) 0.1 % ophthalmic solution Place 1 drop into both eyes 2 times daily      atorvastatin (LIPITOR) 40 MG tablet Take 80 mg by mouth daily       Omega-3 Fatty Acids (OMEGA 3 PO) Take by mouth daily      NONFORMULARY Indications: allgery shot twice a week       omeprazole (PRILOSEC) 40 MG capsule Take 40 mg by mouth daily      Montelukast Sodium (SINGULAIR PO) Take by mouth daily      Levocetirizine Dihydrochloride (XYZAL PO) Take 1 tablet by mouth daily      estradiol 0.025 MG/24HR PTTW Place 1 patch onto the skin Every 4 days.  POTASSIUM CHLORIDE PO Take  by mouth daily.  meloxicam (MOBIC) 15 MG tablet 1 tablet nightly.  Misc Natural Products (OSTEO BI-FLEX ADV DOUBLE ST PO) Take 1 capsule by mouth daily       Multiple Vitamin (MULTIVITAMIN PO) Take  by mouth daily.  hydrochlorothiazide (HYDRODIURIL) 25 MG tablet Take 25 mg by mouth daily. (Patient not taking: Reported on 1/27/2022)       No current facility-administered medications for this visit. Allergies   Allergen Reactions    Avelox [Moxifloxacin] Anaphylaxis    Dilaudid [Hydromorphone Hcl] Anaphylaxis    Erythromycin Hives    Fentanyl      insomnia    Morphine      anxiety    Nubain [Nalbuphine Hcl]      Flu symptoms    Penicillins Hives    Prochlorperazine Edisylate      Hallucinations    Sulfa Antibiotics Hives    Tigan [Trimethobenzamide-Benzocaine]      HYPERTENSION       Review of Systems   Constitutional: Negative. HENT: Positive for tinnitus. Eyes: Negative. Respiratory: Negative. Cardiovascular: Negative. Gastrointestinal: Negative. Endocrine: Negative.     Genitourinary: Negative. Musculoskeletal: Positive for arthralgias, back pain and myalgias. Allergic/Immunologic: Negative. Neurological: Positive for speech difficulty and headaches. Hematological: Negative. Psychiatric/Behavioral: The patient is nervous/anxious. Objective:   Physical Exam  Vitals:    01/27/22 1459   BP: (!) 165/88   Pulse: 85   Temp:      weight: 286 lb (129.7 kg)    Neurological Examination  Constitutional .General exam well groomed   Head/Ears /Nose/Throat: external ear . Normal exam  Neck and thyroid . Normal size. No bruits  Respiratory . Breathsounds clear bilaterally  Cardiovascular: Auscultation of heart with regular rate and rhythm  Musculoskeletal. Muscle bulk and tone normal                                                           Muscle strength 5/5 strength throughout                                                                                No dysmetria or dysdiadokinesis  No tremor   Normal fine motor  Gait normal   Orientation Alert and oriented x 3   Attention and concentration normal  Short term memory normal  Language process and speech normal . No aphasia   Cranial nerve 2 normal acuety and visual fields  Cranial nerve 3, 4 and 6 . Extraocular muscles are intact . Pupils are equal and reactive   Cranial nerve 5 . Normal strength of masseter and temporalis . Intact corneal reflex. Normal facial sensation  Cranial nerve 7 normal exam   Cranial nerve 8. Grossly intact hearing   Cranial nerve 9 and 10. Symmetric palate elevation   Cranial nerve 11 , 5 out of 5 strength   Cranial Nerve 12 midline tongue . No atrophy  Sensation . Normal proprioception . Intact Vibration . Normal pinprick and light touch   Deep Tendon Reflexes normal  Plantar response flexor bilaterally    Assessment:       Diagnosis Orders   1. Migraine with aura and without status migrainosus, not intractable  verapamil (CALAN SR) 240 MG extended release tablet   2.  Morbid obesity ([address]     She is to continue current regimen     Plan:      As above

## 2022-03-15 ENCOUNTER — ANESTHESIA EVENT (OUTPATIENT)
Dept: OPERATING ROOM | Age: 62
End: 2022-03-15
Payer: COMMERCIAL

## 2022-03-16 ENCOUNTER — HOSPITAL ENCOUNTER (OUTPATIENT)
Age: 62
Setting detail: OUTPATIENT SURGERY
Discharge: HOME | End: 2022-03-16
Attending: INTERNAL MEDICINE | Admitting: INTERNAL MEDICINE
Payer: COMMERCIAL

## 2022-03-16 ENCOUNTER — ANESTHESIA (OUTPATIENT)
Dept: OPERATING ROOM | Age: 62
End: 2022-03-16
Payer: COMMERCIAL

## 2022-03-16 VITALS — DIASTOLIC BLOOD PRESSURE: 67 MMHG | SYSTOLIC BLOOD PRESSURE: 149 MMHG | OXYGEN SATURATION: 99 %

## 2022-03-16 VITALS
BODY MASS INDEX: 50.02 KG/M2 | WEIGHT: 293 LBS | SYSTOLIC BLOOD PRESSURE: 149 MMHG | TEMPERATURE: 96.8 F | RESPIRATION RATE: 19 BRPM | DIASTOLIC BLOOD PRESSURE: 91 MMHG | HEART RATE: 76 BPM | HEIGHT: 64 IN | OXYGEN SATURATION: 99 %

## 2022-03-16 PROCEDURE — 6360000002 HC RX W HCPCS: Performed by: NURSE ANESTHETIST, CERTIFIED REGISTERED

## 2022-03-16 PROCEDURE — 3700000001 HC ADD 15 MINUTES (ANESTHESIA): Performed by: INTERNAL MEDICINE

## 2022-03-16 PROCEDURE — 88342 IMHCHEM/IMCYTCHM 1ST ANTB: CPT

## 2022-03-16 PROCEDURE — 88305 TISSUE EXAM BY PATHOLOGIST: CPT

## 2022-03-16 PROCEDURE — 2500000003 HC RX 250 WO HCPCS: Performed by: NURSE ANESTHETIST, CERTIFIED REGISTERED

## 2022-03-16 PROCEDURE — 2580000003 HC RX 258: Performed by: ANESTHESIOLOGY

## 2022-03-16 PROCEDURE — 7100000011 HC PHASE II RECOVERY - ADDTL 15 MIN: Performed by: INTERNAL MEDICINE

## 2022-03-16 PROCEDURE — 3609012400 HC EGD TRANSORAL BIOPSY SINGLE/MULTIPLE: Performed by: INTERNAL MEDICINE

## 2022-03-16 PROCEDURE — 3700000000 HC ANESTHESIA ATTENDED CARE: Performed by: INTERNAL MEDICINE

## 2022-03-16 PROCEDURE — 2709999900 HC NON-CHARGEABLE SUPPLY: Performed by: INTERNAL MEDICINE

## 2022-03-16 PROCEDURE — 7100000010 HC PHASE II RECOVERY - FIRST 15 MIN: Performed by: INTERNAL MEDICINE

## 2022-03-16 RX ORDER — LIDOCAINE HYDROCHLORIDE 20 MG/ML
INJECTION, SOLUTION INFILTRATION; PERINEURAL PRN
Status: DISCONTINUED | OUTPATIENT
Start: 2022-03-16 | End: 2022-03-16 | Stop reason: SDUPTHER

## 2022-03-16 RX ORDER — SODIUM CHLORIDE 0.9 % (FLUSH) 0.9 %
10 SYRINGE (ML) INJECTION EVERY 12 HOURS SCHEDULED
Status: DISCONTINUED | OUTPATIENT
Start: 2022-03-16 | End: 2022-03-16 | Stop reason: HOSPADM

## 2022-03-16 RX ORDER — PROPOFOL 10 MG/ML
INJECTION, EMULSION INTRAVENOUS PRN
Status: DISCONTINUED | OUTPATIENT
Start: 2022-03-16 | End: 2022-03-16 | Stop reason: SDUPTHER

## 2022-03-16 RX ORDER — LIDOCAINE HYDROCHLORIDE 10 MG/ML
1 INJECTION, SOLUTION EPIDURAL; INFILTRATION; INTRACAUDAL; PERINEURAL
Status: DISCONTINUED | OUTPATIENT
Start: 2022-03-17 | End: 2022-03-16 | Stop reason: HOSPADM

## 2022-03-16 RX ORDER — ALPRAZOLAM 0.5 MG/1
0.5 TABLET ORAL NIGHTLY PRN
COMMUNITY

## 2022-03-16 RX ORDER — SODIUM CHLORIDE 0.9 % (FLUSH) 0.9 %
10 SYRINGE (ML) INJECTION PRN
Status: DISCONTINUED | OUTPATIENT
Start: 2022-03-16 | End: 2022-03-16 | Stop reason: HOSPADM

## 2022-03-16 RX ORDER — SODIUM CHLORIDE 9 MG/ML
INJECTION, SOLUTION INTRAVENOUS CONTINUOUS
Status: DISCONTINUED | OUTPATIENT
Start: 2022-03-17 | End: 2022-03-16 | Stop reason: HOSPADM

## 2022-03-16 RX ORDER — SODIUM CHLORIDE, SODIUM LACTATE, POTASSIUM CHLORIDE, CALCIUM CHLORIDE 600; 310; 30; 20 MG/100ML; MG/100ML; MG/100ML; MG/100ML
INJECTION, SOLUTION INTRAVENOUS CONTINUOUS
Status: DISCONTINUED | OUTPATIENT
Start: 2022-03-17 | End: 2022-03-16 | Stop reason: HOSPADM

## 2022-03-16 RX ORDER — TIZANIDINE 4 MG/1
4 TABLET ORAL EVERY 6 HOURS PRN
COMMUNITY

## 2022-03-16 RX ORDER — SODIUM CHLORIDE 9 MG/ML
25 INJECTION, SOLUTION INTRAVENOUS PRN
Status: DISCONTINUED | OUTPATIENT
Start: 2022-03-16 | End: 2022-03-16 | Stop reason: HOSPADM

## 2022-03-16 RX ADMIN — PROPOFOL 20 MG: 10 INJECTION, EMULSION INTRAVENOUS at 09:34

## 2022-03-16 RX ADMIN — PROPOFOL 50 MG: 10 INJECTION, EMULSION INTRAVENOUS at 09:31

## 2022-03-16 RX ADMIN — SODIUM CHLORIDE, POTASSIUM CHLORIDE, SODIUM LACTATE AND CALCIUM CHLORIDE: 600; 310; 30; 20 INJECTION, SOLUTION INTRAVENOUS at 08:08

## 2022-03-16 RX ADMIN — PROPOFOL 50 MG: 10 INJECTION, EMULSION INTRAVENOUS at 09:24

## 2022-03-16 RX ADMIN — PROPOFOL 20 MG: 10 INJECTION, EMULSION INTRAVENOUS at 09:29

## 2022-03-16 RX ADMIN — PROPOFOL 50 MG: 10 INJECTION, EMULSION INTRAVENOUS at 09:22

## 2022-03-16 RX ADMIN — PROPOFOL 20 MG: 10 INJECTION, EMULSION INTRAVENOUS at 09:26

## 2022-03-16 RX ADMIN — PROPOFOL 50 MG: 10 INJECTION, EMULSION INTRAVENOUS at 09:21

## 2022-03-16 RX ADMIN — PROPOFOL 20 MG: 10 INJECTION, EMULSION INTRAVENOUS at 09:33

## 2022-03-16 RX ADMIN — LIDOCAINE HYDROCHLORIDE 100 MG: 20 INJECTION, SOLUTION INFILTRATION; PERINEURAL at 09:20

## 2022-03-16 RX ADMIN — PROPOFOL 50 MG: 10 INJECTION, EMULSION INTRAVENOUS at 09:20

## 2022-03-16 RX ADMIN — PROPOFOL 30 MG: 10 INJECTION, EMULSION INTRAVENOUS at 09:30

## 2022-03-16 ASSESSMENT — PAIN DESCRIPTION - LOCATION: LOCATION: KNEE

## 2022-03-16 ASSESSMENT — PULMONARY FUNCTION TESTS
PIF_VALUE: 1
PIF_VALUE: 3
PIF_VALUE: 1

## 2022-03-16 ASSESSMENT — PAIN SCALES - GENERAL
PAINLEVEL_OUTOF10: 0
PAINLEVEL_OUTOF10: 7
PAINLEVEL_OUTOF10: 0

## 2022-03-16 ASSESSMENT — PAIN DESCRIPTION - DESCRIPTORS: DESCRIPTORS: ACHING

## 2022-03-16 ASSESSMENT — PAIN DESCRIPTION - PAIN TYPE: TYPE: CHRONIC PAIN

## 2022-03-16 ASSESSMENT — PAIN DESCRIPTION - ORIENTATION: ORIENTATION: RIGHT;LEFT

## 2022-03-16 ASSESSMENT — PAIN DESCRIPTION - FREQUENCY: FREQUENCY: CONTINUOUS

## 2022-03-16 ASSESSMENT — ENCOUNTER SYMPTOMS: SHORTNESS OF BREATH: 0

## 2022-03-16 NOTE — ANESTHESIA POSTPROCEDURE EVALUATION
Department of Anesthesiology  Postprocedure Note    Patient: Sean Quezada  MRN: [id number]  YOB: 1960  Date of evaluation: 3/16/2022  Time:  1:13 PM     Procedure Summary     Date: 03/16/22 Room / Location: [place] [address] / [facility] - INPATIENT    Anesthesia Start: 7904 Anesthesia Stop: 5797    Procedure: EGD BIOPSY (N/A ) Diagnosis: (DX GASTRIC NODULE)    Surgeons: Smita Dillon MD Responsible Provider: Yi Lopez MD    Anesthesia Type: MAC ASA Status: 3          Anesthesia Type: MAC    Leland Phase I: Leland Score: 10    Leland Phase II: Leland Score: 10    Last vitals: Reviewed and per EMR flowsheets.        Anesthesia Post Evaluation    Complications: no

## 2022-03-16 NOTE — OP NOTE
Operative Note      Patient: Elba Thomas  YOB: 1960  MRN: [id number]    Date of Procedure: 3/16/2022    Pre-Op Diagnosis: DX GASTRIC NODULE    Indication for the procedure: Patient is a pleasant 63years old female who had an EGD 2 years ago when she was found to have a nodule in the stomach. She is scheduled for surveillance EGD. Post-Op Diagnosis: Irregular Z-line, gastritis, gastric nodule and gastric polyps       Procedure(s):  EGD BIOPSY    Surgeon(s): Belen Chamberlain MD    Assistant:   * No surgical staff found *    Informed consent: Risks, benefits, and alternatives of the procedure were explained to the patient prior to the procedure. Risks include but not limited to bleeding, perforation, aspiration, allergic reaction to medication or death. Patient was also informed about the risk of missing a lesion. Anesthesia: Monitor Anesthesia Care    Estimated Blood Loss (mL): Minimal    Complications: None    Specimens:   ID Type Source Tests Collected by Time Destination   A : RULE OUT CELIAC DISEASE Tissue Duodenum SURGICAL PATHOLOGY Belen Chamberlain MD 3/16/2022 0921    B : [address] Tissue Stomach SURGICAL PATHOLOGY Belen Chamberlain MD 3/16/2022 0921    C : GASTRIC POLYP Tissue Stomach SURGICAL PATHOLOGY Belen Chamberlain MD 3/16/2022 0930    D : GASTRIC NODULE FOR BIOPSY Tissue Stomach SURGICAL PATHOLOGY Belen Chamberlain MD 3/16/2022 0932    E : DISTAL ESOPHAGUS RULE OUT BARRETTS Tissue Esophagus SURGICAL PATHOLOGY Belen Chamberlain MD 3/16/2022 4137        Implants:  * No implants in log *      Drains: * No LDAs found *    Findings: 1-irregular Z-line. Biopsies were taken to rule out Dickson's esophagus. 2-patchy hyperemia of the stomach. Biopsies were taken to rule out H. Pylori. 3-few polyps in the gastric body and fundus that measured 4 to 5 mm. Biopsies were taken. 4-mucosal nodule in the gastric antrum. Biopsies were taken.     Detailed Description of Procedure:   Patient was placed in the left lateral decubitus position. The well-lubricated Olympus EGD scope was passed through the bite-block was advanced into esophagus. Esophageal mucosa of the upper middle esophagus appeared normal.  Biopsies were taken. The scope was then advanced into the distal esophagus. The Z-line was irregular. Biopsies were taken to rule out Dickson's esophagus. The scope was then advanced into the stomach. There was patchy hyperemia of the antrum and gastric body. Biopsies were taken from the antrum and gastric body to rule out H. pylori. There was few polyps in the gastric body and fundus that measured 4 to 5 mm. Biopsies were taken. In the antrum there was a gastric nodule that appeared mucosal.  That measured approximately 15 mm. Biopsies were taken. The nodule appeared benign. The scope was then advanced into the duodenum. Duodenal bulb and second portion of the #appeared normal.  Biopsies were taken to rule out celiac disease. The scope was then removed outside the patient. Plan: 1. Follow up on results of pathology  2-follow-up in the office in 1 to 2 weeks.     Electronically signed by Jessica Gee MD on 3/16/2022 at 9:38 AM

## 2022-03-16 NOTE — ANESTHESIA PRE PROCEDURE
Department of Anesthesiology  Preprocedure Note       Name:  Ventura Link   Age:  63 y.o.  :  1960                                          MRN:  [id number]         Date:  3/16/2022      Surgeon: Daniel Rosen): Marek Price MD    Procedure: Procedure(s):  EGD ESOPHAGOGASTRODUODENOSCOPY    Medications prior to admission:   Prior to Admission medications    Medication Sig Start Date End Date Taking?  Authorizing Provider   butalbital-acetaminophen-caffeine (FIORICET, ESGIC) -40 MG per tablet TAKE 1 TABLET BY MOUTH EVERY 4 HOURS AS NEEDED FOR HEADACHE 22   Lizzie Hernandez MD   promethazine (PHENERGAN) 25 MG tablet TAKE 1 TABLET EVERY 8 HOURS AS NEEDED FOR NAUSEA 22   Lizzie Hrenandez MD   verapamil (CALAN SR) 240 MG extended release tablet TAKE ONE TABLET BY MOUTH EVERY NIGHT AT BEDTIME 22   Lizzie Hernandez MD   furosemide (LASIX) 40 MG tablet Take 40 mg by mouth daily     Historical Provider, MD   Cholecalciferol (D3 VITAMIN PO) Take by mouth daily    Historical Provider, MD   vitamin C (ASCORBIC ACID) 500 MG tablet Take 500 mg by mouth 2 times daily     Historical Provider, MD   Coenzyme Q10 (CO Q 10 PO) Take by mouth daily    Historical Provider, MD   fluticasone (FLONASE) 50 MCG/ACT nasal spray 1 spray by Each Nostril route daily    Historical Provider, MD   olopatadine (PATANOL) 0.1 % ophthalmic solution Place 1 drop into both eyes 2 times daily    Historical Provider, MD   atorvastatin (LIPITOR) 40 MG tablet Take 80 mg by mouth daily     Historical Provider, MD   Omega-3 Fatty Acids (OMEGA 3 PO) Take by mouth daily    Historical Provider, MD   NONFORMULARY Indications: allgery shot twice a week     Historical Provider, MD   omeprazole (PRILOSEC) 40 MG capsule Take 40 mg by mouth daily    Historical Provider, MD   Montelukast Sodium (SINGULAIR PO) Take by mouth daily    Historical Provider, MD   Levocetirizine Dihydrochloride (XYZAL PO) Take 1 tablet by mouth daily Historical Provider, MD   estradiol 0.025 MG/24HR PTTW Place 1 patch onto the skin Every 4 days. Historical Provider, MD   POTASSIUM CHLORIDE PO Take  by mouth daily. Historical Provider, MD   meloxicam (MOBIC) 15 MG tablet 1 tablet nightly. 11   Historical Provider, MD   Misc Natural Products (OSTEO BI-FLEX ADV DOUBLE ST PO) Take 1 capsule by mouth daily     Historical Provider, MD   Multiple Vitamin (MULTIVITAMIN PO) Take  by mouth daily. Historical Provider, MD   hydrochlorothiazide (HYDRODIURIL) 25 MG tablet Take 25 mg by mouth daily. Patient not taking: Reported on 2022    Historical Provider, MD       Current medications:    No current facility-administered medications for this encounter. Allergies:     Allergies   Allergen Reactions    Avelox [Moxifloxacin] Anaphylaxis    Dilaudid [Hydromorphone Hcl] Anaphylaxis    Erythromycin Hives    Fentanyl      insomnia    Morphine      anxiety    Nubain [Nalbuphine Hcl]      Flu symptoms    Penicillins Hives    Prochlorperazine Edisylate      Hallucinations    Sulfa Antibiotics Hives    Tigan [Trimethobenzamide-Benzocaine]      HYPERTENSION       Problem List:    Patient Active Problem List   Diagnosis Code    Migraine with aura G43.109       Past Medical History:        Diagnosis Date    Anxiety     Gastroesophageal reflux disease     IBS (irritable bowel syndrome)     Migraine with aura     Torn meniscus     right knee       Past Surgical History:        Procedure Laterality Date     SECTION      DILATION AND CURETTAGE OF UTERUS      with ablation    FOOT SURGERY      GALLBLADDER SURGERY  2001    HYSTERECTOMY  2008       Social History:    Social History     Tobacco Use    Smoking status: Never Smoker    Smokeless tobacco: Never Used   Substance Use Topics    Alcohol use: No     Alcohol/week: 0.0 standard drinks                                Counseling given: Not Answered      Vital Signs (Current):   Vitals:    03/16/22 0735   BP: (!) 167/83   Pulse: 99   Resp: 17   Temp: 96 °F (35.6 °C)   SpO2: 98%                                              BP Readings from Last 3 Encounters:   03/16/22 (!) 167/83   01/27/22 (!) 165/88   01/22/21 130/75       NPO Status:                                                                                 BMI:   Wt Readings from Last 3 Encounters:   01/27/22 286 lb (129.7 kg)   01/22/21 283 lb (128.4 kg)   10/21/19 259 lb (117.5 kg)     There is no height or weight on file to calculate BMI.    CBC:   Lab Results   Component Value Date    WBC 7.9 12/22/2015    RBC 4.46 12/22/2015    HGB 13.7 12/22/2015    HCT 41.0 12/22/2015    MCV 92.0 12/22/2015    RDW 13.8 12/22/2015     12/22/2015       CMP:   Lab Results   Component Value Date     12/22/2015    K 3.7 12/22/2015     12/22/2015    CO2 26 12/22/2015    BUN 17 12/22/2015    CREATININE 0.69 12/22/2015    GFRAA >60 12/22/2015    LABGLOM >60 12/22/2015    ALT 52 12/22/2015       POC Tests: No results for input(s): POCGLU, POCNA, POCK, POCCL, POCBUN, POCHEMO, POCHCT in the last 72 hours. Coags: No results found for: PROTIME, INR, APTT    HCG (If Applicable): No results found for: PREGTESTUR, PREGSERUM, HCG, HCGQUANT     ABGs: No results found for: PHART, PO2ART, PJB6ODY, IQU7GLO, BEART, T3VEDHJV     Type & Screen (If Applicable):  No results found for: LABABO, LABRH    Drug/Infectious Status (If Applicable):  No results found for: HIV, HEPCAB    COVID-19 Screening (If Applicable): No results found for: COVID19        Anesthesia Evaluation    Airway: Mallampati: I  TM distance: >3 FB   Neck ROM: full  Mouth opening: > = 3 FB Dental:          Pulmonary:       (-) shortness of breath                           Cardiovascular:        (-)  angina                Neuro/Psych:               GI/Hepatic/Renal:             Endo/Other:                     Abdominal:             Vascular:           Other Findings:

## 2022-03-16 NOTE — H&P
History and Physical Service   [place] [id number]    HISTORY AND PHYSICAL EXAMINATION            Date of Evaluation: 3/16/2022  Patient name:  Heidi Calvillo  MRN:   [id number]  YOB: 1960  PCP:    Yareli Sweet MD    History Obtained From:     Patient, medical records     History of Present Illness: This is Heidi Calvillo a 63 y.o. female who presents today for an EGD  by Dr. Alvin Beltran for  gastric nodule. No family history of colon cancer or polyps. Previous colonoscopy 2018. Previous EGD 2018 . Today denies bloody tarry stools, diarrhea alternating with constipation, fever, chills, night sweats, pain or unexplained weight loss. No history of ulcers, hiatal hernia, Yes IBS. Last solid food yesterday 9pm. Last liquids 11:45 yesterday night. Yes acid reflux/GERD. Took mobic yesterday 11:45pm. Denies chest pain or shortness of breath. Denies any removable dentures or partials. Past Medical History:     Past Medical History:   Diagnosis Date    Anxiety     Gastroesophageal reflux disease     IBS (irritable bowel syndrome)     Migraine with aura     Torn meniscus     right knee        Past Surgical History:     Past Surgical History:   Procedure Laterality Date     SECTION      DILATION AND CURETTAGE OF UTERUS      with ablation    FOOT SURGERY      GALLBLADDER SURGERY      HYSTERECTOMY          Medications Prior to Admission:     Prior to Admission medications    Medication Sig Start Date End Date Taking?  Authorizing Provider   butalbital-acetaminophen-caffeine (FIORICET, ESGIC) -40 MG per tablet TAKE 1 TABLET BY MOUTH EVERY 4 HOURS AS NEEDED FOR HEADACHE 22   Alisa Becerra MD   promethazine (PHENERGAN) 25 MG tablet TAKE 1 TABLET EVERY 8 HOURS AS NEEDED FOR NAUSEA 22   Alisa Becerra MD   verapamil (CALAN SR) 240 MG extended release tablet TAKE ONE TABLET BY MOUTH EVERY NIGHT AT BEDTIME 22   [facility] Carl Ludwig MD   furosemide (LASIX) 40 MG tablet Take 40 mg by mouth daily     Historical Provider, MD   Cholecalciferol (D3 VITAMIN PO) Take by mouth daily    Historical Provider, MD   vitamin C (ASCORBIC ACID) 500 MG tablet Take 500 mg by mouth 2 times daily     Historical Provider, MD   Coenzyme Q10 (CO Q 10 PO) Take by mouth daily    Historical Provider, MD   fluticasone (FLONASE) 50 MCG/ACT nasal spray 1 spray by Each Nostril route daily    Historical Provider, MD   olopatadine (PATANOL) 0.1 % ophthalmic solution Place 1 drop into both eyes 2 times daily    Historical Provider, MD   atorvastatin (LIPITOR) 40 MG tablet Take 80 mg by mouth daily     Historical Provider, MD   Omega-3 Fatty Acids (OMEGA 3 PO) Take by mouth daily    Historical Provider, MD   NONFORMULARY Indications: allgery shot twice a week     Historical Provider, MD   omeprazole (PRILOSEC) 40 MG capsule Take 40 mg by mouth daily    Historical Provider, MD   Montelukast Sodium (SINGULAIR PO) Take by mouth daily    Historical Provider, MD   Levocetirizine Dihydrochloride (XYZAL PO) Take 1 tablet by mouth daily    Historical Provider, MD   estradiol 0.025 MG/24HR PTTW Place 1 patch onto the skin Every 4 days. Historical Provider, MD   POTASSIUM CHLORIDE PO Take  by mouth daily. Historical Provider, MD   meloxicam (MOBIC) 15 MG tablet 1 tablet nightly. 11/30/11   Historical Provider, MD   Misc Natural Products (OSTEO BI-FLEX ADV DOUBLE ST PO) Take 1 capsule by mouth daily     Historical Provider, MD   Multiple Vitamin (MULTIVITAMIN PO) Take  by mouth daily. Historical Provider, MD   hydrochlorothiazide (HYDRODIURIL) 25 MG tablet Take 25 mg by mouth daily. Patient not taking: Reported on 1/27/2022    Historical Provider, MD        Allergies:      Avelox [moxifloxacin], Dilaudid [hydromorphone hcl], Erythromycin, Fentanyl, Morphine, Nubain [nalbuphine hcl], Penicillins, Prochlorperazine edisylate, Sulfa antibiotics, and Tigan [trimethobenzamide-benzocaine]    Social History:     Tobacco:    reports that she has never smoked. She has never used smokeless tobacco.  Alcohol:      reports no history of alcohol use. Drug Use:  reports no history of drug use. Family History:     Family History   Problem Relation Age of Onset    Heart Disease Mother     Heart Disease Father     Hypertension Father     Diabetes Father     Stroke Father        Review of Systems:     Positive and Negative as described in HPI. CONSTITUTIONAL:  negative for fevers, chills, sweats, fatigue, weight loss  HEENT:  negative for vision, hearing changes, runny nose, throat pain  RESPIRATORY:  negative for shortness of breath, cough, congestion, wheezing. CARDIOVASCULAR:  negative for chest pain, palpitations. GASTROINTESTINAL:  negative for nausea, vomiting, diarrhea, constipation, change in bowel habits, abdominal pain   GENITOURINARY:  negative for difficulty of urination, burning with urination, frequency   INTEGUMENT:  negative for rash, skin lesions, easy bruising   HEMATOLOGIC/LYMPHATIC:  negative for swelling/edema   ALLERGIC/IMMUNOLOGIC:  negative for urticaria , itching  ENDOCRINE:  negative increase in drinking, increase in urination, hot or cold intolerance  MUSCULOSKELETAL:  negative joint pains, muscle aches, swelling of joints  NEUROLOGICAL:  negative for headaches, dizziness, lightheadedness, numbness, pain, tingling extremities  BEHAVIOR/PSYCH:  negative for depression, anxiety    Physical Exam:   BP (!) 167/83   Pulse 99   Temp 96 °F (35.6 °C)   Resp 17   SpO2 98%   No LMP recorded. No obstetric history on file. No results for input(s): POCGLU in the last 72 hours. General Appearance:  alert, well appearing, and in no acute distress  Mental status: oriented to person, place, and time with normal affect  Head:  normocephalic, atraumatic.   Eye: no icterus, redness, pupils equal and reactive, extraocular eye movements intact, conjunctiva clear  Ear: normal external ear, no discharge, hearing intact  Nose:  no drainage noted  Mouth: mucous membranes moist  Neck: supple, no carotid bruits, thyroid not palpable  Lungs: Bilateral equal air entry, clear to ausculation, no wheezing, rales or rhonchi, normal effort  Cardiovascular: normal rate, regular rhythm, no murmur, gallop, rub. Abdomen: Soft, nontender, nondistended, normal bowel sounds, no hepatomegaly or splenomegaly  Neurologic: There are no new focal motor or sensory deficits, normal muscle tone and bulk, no abnormal sensation, normal speech, cranial nerves II through XII grossly intact  Skin: No gross lesions, rashes, bruising or bleeding on exposed skin area  Extremities:  peripheral pulses palpable, no pedal edema or calf pain with palpation  Psych: normal affect     Investigations:      Laboratory Testing:  No results found for this or any previous visit (from the past 24 hour(s)). No results for input(s): HGB, HCT, WBC, MCV, PLATELET, NA, K, CL, CO2, BUN, CREATININE, GLUCOSE, INR, PROTIME, APTT, AST, ALT, LABALBU, HCG in the last 720 hours. No results for input(s): COVID19 in the last 720 hours. Imaging/Diagnostics:      Diagnosis:      1.  Gastric nodule     Plans:     1.  egd    KASH Telles CNP  Electronically signed 3/16/2022 at 7:47 AM

## 2022-03-18 LAB — SURGICAL PATHOLOGY REPORT: NORMAL

## 2022-06-02 RX ORDER — PROMETHAZINE HYDROCHLORIDE 25 MG/1
TABLET ORAL
Qty: 90 TABLET | Refills: 1 | Status: SHIPPED | OUTPATIENT
Start: 2022-06-02

## 2022-06-02 NOTE — TELEPHONE ENCOUNTER
Pharmacy requesting refill of promethazine (PHENERGAN) 25 MG tablet       Medication active on med list yes      Date of last Rx: 01/27/2022 with 1 refills          verified by LACEY VYAS      Date of last appointment 1/27/2022    Next Visit Date:  12/29/2022    Please review allergy list.

## 2022-10-03 ENCOUNTER — HOSPITAL ENCOUNTER (EMERGENCY)
Age: 62
Discharge: HOME | End: 2022-10-03
Attending: EMERGENCY MEDICINE
Payer: COMMERCIAL

## 2022-10-03 VITALS
TEMPERATURE: 98.6 F | OXYGEN SATURATION: 98 % | RESPIRATION RATE: 16 BRPM | HEIGHT: 64 IN | HEART RATE: 113 BPM | SYSTOLIC BLOOD PRESSURE: 142 MMHG | DIASTOLIC BLOOD PRESSURE: 80 MMHG | BODY MASS INDEX: 49.51 KG/M2 | WEIGHT: 290 LBS

## 2022-10-03 DIAGNOSIS — G89.29 CHRONIC LEFT SHOULDER PAIN: Primary | ICD-10-CM

## 2022-10-03 DIAGNOSIS — I44.7 LEFT BUNDLE BRANCH BLOCK: ICD-10-CM

## 2022-10-03 DIAGNOSIS — M25.512 CHRONIC LEFT SHOULDER PAIN: Primary | ICD-10-CM

## 2022-10-03 LAB
ABSOLUTE EOS #: 0.12 K/UL (ref 0–0.4)
ABSOLUTE LYMPH #: 4.83 K/UL (ref 1–4.8)
ABSOLUTE MONO #: 1.15 K/UL (ref 0.1–0.8)
ALBUMIN SERPL-MCNC: 4.2 G/DL (ref 3.5–5.2)
ALBUMIN/GLOBULIN RATIO: 1.8 (ref 1–2.5)
ALP BLD-CCNC: 118 U/L (ref 35–104)
ALT SERPL-CCNC: 46 U/L (ref 5–33)
ANION GAP SERPL CALCULATED.3IONS-SCNC: 13 MMOL/L (ref 9–17)
AST SERPL-CCNC: 30 U/L
ATYPICAL LYMPHOCYTE ABSOLUTE COUNT: 0.23 K/UL
ATYPICAL LYMPHOCYTES: 2 %
BASOPHILS # BLD: 0 % (ref 0–2)
BASOPHILS ABSOLUTE: 0 K/UL (ref 0–0.2)
BILIRUB SERPL-MCNC: 0.3 MG/DL (ref 0.3–1.2)
BUN BLDV-MCNC: 18 MG/DL (ref 8–23)
CALCIUM SERPL-MCNC: 9.6 MG/DL (ref 8.6–10.4)
CHLORIDE BLD-SCNC: 104 MMOL/L (ref 98–107)
CO2: 26 MMOL/L (ref 20–31)
CREAT SERPL-MCNC: 0.77 MG/DL (ref 0.5–0.9)
EKG ATRIAL RATE: 108 BPM
EKG P AXIS: 27 DEGREES
EKG P-R INTERVAL: 160 MS
EKG Q-T INTERVAL: 378 MS
EKG QRS DURATION: 142 MS
EKG QTC CALCULATION (BAZETT): 506 MS
EKG R AXIS: -8 DEGREES
EKG T AXIS: 103 DEGREES
EKG VENTRICULAR RATE: 108 BPM
EOSINOPHILS RELATIVE PERCENT: 1 % (ref 1–4)
GFR AFRICAN AMERICAN: >60 ML/MIN
GFR NON-AFRICAN AMERICAN: >60 ML/MIN
GFR SERPL CREATININE-BSD FRML MDRD: ABNORMAL ML/MIN/{1.73_M2}
GLUCOSE BLD-MCNC: 171 MG/DL (ref 70–99)
HCT VFR BLD CALC: 40.8 % (ref 36–46)
HEMOGLOBIN: 14 G/DL (ref 12–16)
INR BLD: 1
LYMPHOCYTES # BLD: 42 % (ref 24–44)
MAGNESIUM: 1.8 MG/DL (ref 1.6–2.6)
MCH RBC QN AUTO: 32.8 PG (ref 26–34)
MCHC RBC AUTO-ENTMCNC: 34.3 G/DL (ref 31–37)
MCV RBC AUTO: 95.6 FL (ref 80–100)
MONOCYTES # BLD: 10 % (ref 1–7)
MORPHOLOGY: NORMAL
PARTIAL THROMBOPLASTIN TIME: 22.4 SEC (ref 21.3–31.3)
PDW BLD-RTO: 13.2 % (ref 12.5–15.4)
PLATELET # BLD: 222 K/UL (ref 140–450)
PMV BLD AUTO: 10.4 FL (ref 8–14)
POC TROPONIN I: 0.01 NG/ML (ref 0–0.1)
POC TROPONIN INTERP: NORMAL
POTASSIUM SERPL-SCNC: 3.6 MMOL/L (ref 3.7–5.3)
PROTHROMBIN TIME: 10.2 SEC (ref 9.4–12.6)
RBC # BLD: 4.27 M/UL (ref 4–5.2)
SEG NEUTROPHILS: 45 % (ref 36–66)
SEGMENTED NEUTROPHILS ABSOLUTE COUNT: 5.17 K/UL (ref 1.8–7.7)
SODIUM BLD-SCNC: 143 MMOL/L (ref 135–144)
TOTAL PROTEIN: 6.6 G/DL (ref 6.4–8.3)
WBC # BLD: 11.5 K/UL (ref 3.5–11)

## 2022-10-03 PROCEDURE — 36415 COLL VENOUS BLD VENIPUNCTURE: CPT

## 2022-10-03 PROCEDURE — 85025 COMPLETE CBC W/AUTO DIFF WBC: CPT

## 2022-10-03 PROCEDURE — 85730 THROMBOPLASTIN TIME PARTIAL: CPT

## 2022-10-03 PROCEDURE — 6370000000 HC RX 637 (ALT 250 FOR IP): Performed by: EMERGENCY MEDICINE

## 2022-10-03 PROCEDURE — 96374 THER/PROPH/DIAG INJ IV PUSH: CPT

## 2022-10-03 PROCEDURE — 83735 ASSAY OF MAGNESIUM: CPT

## 2022-10-03 PROCEDURE — 84484 ASSAY OF TROPONIN QUANT: CPT

## 2022-10-03 PROCEDURE — 93005 ELECTROCARDIOGRAM TRACING: CPT | Performed by: EMERGENCY MEDICINE

## 2022-10-03 PROCEDURE — 6360000002 HC RX W HCPCS: Performed by: EMERGENCY MEDICINE

## 2022-10-03 PROCEDURE — 85610 PROTHROMBIN TIME: CPT

## 2022-10-03 PROCEDURE — 96375 TX/PRO/DX INJ NEW DRUG ADDON: CPT

## 2022-10-03 PROCEDURE — 99284 EMERGENCY DEPT VISIT MOD MDM: CPT

## 2022-10-03 PROCEDURE — 80053 COMPREHEN METABOLIC PANEL: CPT

## 2022-10-03 RX ORDER — KETOROLAC TROMETHAMINE 30 MG/ML
30 INJECTION, SOLUTION INTRAMUSCULAR; INTRAVENOUS ONCE
Status: COMPLETED | OUTPATIENT
Start: 2022-10-03 | End: 2022-10-03

## 2022-10-03 RX ORDER — OXYCODONE HYDROCHLORIDE AND ACETAMINOPHEN 5; 325 MG/1; MG/1
2 TABLET ORAL ONCE
Status: COMPLETED | OUTPATIENT
Start: 2022-10-03 | End: 2022-10-03

## 2022-10-03 RX ORDER — ORPHENADRINE CITRATE 30 MG/ML
60 INJECTION INTRAMUSCULAR; INTRAVENOUS ONCE
Status: COMPLETED | OUTPATIENT
Start: 2022-10-03 | End: 2022-10-03

## 2022-10-03 RX ORDER — OXYCODONE HYDROCHLORIDE AND ACETAMINOPHEN 5; 325 MG/1; MG/1
1 TABLET ORAL EVERY 6 HOURS PRN
Qty: 16 TABLET | Refills: 0 | Status: SHIPPED | OUTPATIENT
Start: 2022-10-03 | End: 2022-10-08

## 2022-10-03 RX ADMIN — KETOROLAC TROMETHAMINE 30 MG: 30 INJECTION, SOLUTION INTRAMUSCULAR at 01:46

## 2022-10-03 RX ADMIN — ORPHENADRINE CITRATE 60 MG: 30 INJECTION INTRAMUSCULAR; INTRAVENOUS at 03:13

## 2022-10-03 RX ADMIN — OXYCODONE HYDROCHLORIDE AND ACETAMINOPHEN 2 TABLET: 5; 325 TABLET ORAL at 03:24

## 2022-10-03 ASSESSMENT — PAIN SCALES - GENERAL
PAINLEVEL_OUTOF10: 6

## 2022-10-03 ASSESSMENT — ENCOUNTER SYMPTOMS
VOMITING: 0
RHINORRHEA: 0
SHORTNESS OF BREATH: 0
DIARRHEA: 0
SORE THROAT: 0
ABDOMINAL PAIN: 0
BACK PAIN: 0
COUGH: 0
EYE PAIN: 0
NAUSEA: 0

## 2022-10-03 ASSESSMENT — PAIN DESCRIPTION - DESCRIPTORS
DESCRIPTORS: THROBBING
DESCRIPTORS: THROBBING

## 2022-10-03 ASSESSMENT — PAIN DESCRIPTION - ORIENTATION
ORIENTATION: LEFT

## 2022-10-03 ASSESSMENT — PAIN DESCRIPTION - LOCATION
LOCATION: SHOULDER

## 2022-10-03 ASSESSMENT — PAIN - FUNCTIONAL ASSESSMENT: PAIN_FUNCTIONAL_ASSESSMENT: 0-10

## 2022-10-03 NOTE — DISCHARGE INSTRUCTIONS
PLEASE RETURN TO THE EMERGENCY DEPARTMENT IMMEDIATELY if your symptoms worsen in anyway or in 1-2 days if not improved for re-evaluation. You should immediately return to the ER for symptoms such as new or worsening pain, fever, numbness or weakness to the arms or legs, coolness or color change of the arms or legs. Take your medication as indicated and prescribed. If you are given an antibiotic then, make sure you get the prescription filled and take the antibiotics until finished. Please understand that at this time there is no evidence for a more serious underlying process, but that early in the process of an illness or injury, an emergency department workup can be falsely reassuring. You should contact your family doctor within the next 48 hours for a follow up appointment as X-rays may not show an occult fracture for several days    Alee Palmer!!!    From [facility] ([facility]) and Percy Lemos    On behalf of the Emergency Department staff at [facility]), I would like to thank you for giving us the opportunity to address your health care needs and concerns. We hope that during your visit, our service was delivered in a professional and caring manner. Please keep [facility] ([facility]) in mind as we walk with you down the path to your own personal wellness. Please expect an automated text message or email from us so we can ask a few questions about your health and progress. Based on your answers, a clinician may call you back to offer help and instructions. Please understand that early in the process of an illness or injury, an emergency department workup can be falsely reassuring. If you notice any worsening, changing or persistent symptoms please call your family doctor or return to the ER immediately. Tell us how we did during your visit at [email or web address]   and let us know about your experience

## 2022-10-03 NOTE — ED PROVIDER NOTES
[address] [facility]  [address] [facility] [address]  Phone: [phone]  Fax: Surekha Brunner [address]          Pt Name: Grey Mendosa  MRN: [id number]  [place] 1960  Date of evaluation: 10/3/2022      CHIEF COMPLAINT       Chief Complaint   Patient presents with    Shoulder Pain     Pt presents with co left shoulder pain that began around 1330 yesterday. HISTORY OF PRESENT ILLNESS       Grey Mendosa is a 63 y.o. female who presents with left shoulder pain that has been going on most of today and present when she woke up. Does have a history of chronic shoulder issues. Does receive injections to her shoulder. It is worse with certain movements and positions. It usually does not last this long which is what caused the patient to come in for further evaluation from a cardiac perspective. She denies any chest pain. Nonradiating pain otherwise from the shoulder and feels like similar shoulder pain exacerbations that were not cardiac. She cannot think of any trauma other than if she might of slept on it wrong. No difficulty breathing or any other symptoms. No prior arrival medications taken    REVIEW OF SYSTEMS       Review of Systems   Constitutional:  Negative for chills, fatigue and fever. HENT:  Negative for rhinorrhea and sore throat. Eyes:  Negative for pain. Respiratory:  Negative for cough and shortness of breath. Cardiovascular:  Negative for chest pain. Gastrointestinal:  Negative for abdominal pain, diarrhea, nausea and vomiting. Genitourinary:  Negative for difficulty urinating. Musculoskeletal:  Negative for back pain and neck pain. Skin:  Negative for rash. Neurological:  Negative for weakness and headaches.       PAST MEDICAL HISTORY    has a past medical history of Allergies, Anxiety, Arthritis, Gastroesophageal reflux disease, History of blood transfusion, Hyperlipidemia, IBS (irritable bowel syndrome), Migraine with aura, and Torn meniscus. SURGICAL HISTORY      has a past surgical history that includes  section (); Dilation and curettage of uterus (); Hysterectomy (); Foot surgery; Gallbladder surgery (); Endoscopy, colon, diagnostic; Colonoscopy; Knee cartilage surgery (Bilateral); Cholecystectomy; other surgical history; and Upper gastrointestinal endoscopy (N/A, 3/16/2022). CURRENT MEDICATIONS       Previous Medications    ALPRAZOLAM (XANAX) 0.5 MG TABLET    Take 0.5 mg by mouth nightly as needed for Sleep. ATORVASTATIN (LIPITOR) 40 MG TABLET    Take 80 mg by mouth daily     BUTALBITAL-ACETAMINOPHEN-CAFFEINE (FIORICET, ESGIC) -40 MG PER TABLET    TAKE 1 TABLET BY MOUTH EVERY 4 HOURS AS NEEDED FOR HEADACHE    CHOLECALCIFEROL (D3 VITAMIN PO)    Take by mouth daily    ESTRADIOL 0.025 MG/24HR PTTW    Place 1 patch onto the skin Every 4 days. FLUTICASONE (FLONASE) 50 MCG/ACT NASAL SPRAY    1 spray by Each Nostril route daily    FUROSEMIDE (LASIX) 40 MG TABLET    Take 40 mg by mouth daily     LEVOCETIRIZINE DIHYDROCHLORIDE (XYZAL PO)    Take 1 tablet by mouth daily    MELOXICAM (MOBIC) 15 MG TABLET    1 tablet nightly. MISC NATURAL PRODUCTS (OSTEO BI-FLEX ADV DOUBLE ST PO)    Take 1 capsule by mouth daily     MONTELUKAST SODIUM (SINGULAIR PO)    Take by mouth daily    MULTIPLE VITAMIN (MULTIVITAMIN PO)    Take  by mouth daily. NONFORMULARY    Indications: allgery shot once a week     OLOPATADINE (PATANOL) 0.1 % OPHTHALMIC SOLUTION    Place 1 drop into both eyes 2 times daily    OMEGA-3 FATTY ACIDS (OMEGA 3 PO)    Take by mouth daily    OMEPRAZOLE (PRILOSEC) 40 MG CAPSULE    Take 40 mg by mouth daily    POTASSIUM CHLORIDE PO    Take  by mouth daily.       PROMETHAZINE (PHENERGAN) 25 MG TABLET    TAKE ONE TABLET BY MOUTH EVERY 8 HOURS AS NEEDED FOR NAUSEA    TIZANIDINE (ZANAFLEX) 4 MG TABLET    Take 4 mg by mouth every 6 hours as needed    VERAPAMIL (CALAN SR) 240 MG EXTENDED RELEASE TABLET    TAKE ONE TABLET BY MOUTH EVERY NIGHT AT BEDTIME    VITAMIN C (ASCORBIC ACID) 500 MG TABLET    Take 500 mg by mouth 2 times daily        ALLERGIES     is allergic to avelox [moxifloxacin], dilaudid [hydromorphone hcl], erythromycin, fentanyl, morphine, nubain [nalbuphine hcl], penicillins, prochlorperazine edisylate, sulfa antibiotics, and tigan [trimethobenzamide-benzocaine]. FAMILY HISTORY     She indicated that her mother is . She indicated that her father is . She indicated that both of her sisters are alive. family history includes Diabetes in her father; Heart Disease in her father and mother; Hypertension in her father; Stroke in her father. SOCIAL HISTORY      reports that she has never smoked. She has never used smokeless tobacco. She reports that she does not drink alcohol and does not use drugs. PHYSICAL EXAM     INITIAL VITALS:  height is 5' 3.5\" (1.613 m) and weight is 131.5 kg (290 lb). Her oral temperature is 98.6 °F (37 °C). Her blood pressure is 142/80 (abnormal) and her pulse is 113 (abnormal). Her respiration is 16 and oxygen saturation is 98%. Physical Exam  Vitals reviewed. Constitutional:       General: She is not in acute distress. Appearance: She is well-developed. She is not ill-appearing or toxic-appearing. HENT:      Head: Normocephalic and atraumatic. Right Ear: External ear normal.      Left Ear: External ear normal.   Eyes:      General: Lids are normal.   Neck:      Trachea: No tracheal deviation. Cardiovascular:      Rate and Rhythm: Normal rate and regular rhythm. Pulmonary:      Effort: Pulmonary effort is normal. No respiratory distress. Breath sounds: Normal breath sounds. Musculoskeletal:      Comments: Patient does have lateral areas of her left shoulder that are tender and reproducible that she reports is the pain she is having. Range of motion is normal.  No deformity or evidence of subluxation. Normal axillary nerve function. Normal distal pulses, motor and sensation. Otherwise unremarkable arm exam.  Skin exam is normal without rash or obvious other abnormalities. No ecchymosis. Skin:     General: Skin is warm and dry. Neurological:      Mental Status: She is alert. GCS: GCS eye subscore is 4. GCS verbal subscore is 5. GCS motor subscore is 6. Psychiatric:         Speech: Speech normal.         DIFFERENTIAL DIAGNOSIS/ MDM:     At this time I feel this is most likely secondary to her chronic shoulder issues. She does have a left bundle branch block and she reports she has had ECGs done at a different facility that we will try to obtain for comparison    DIAGNOSTIC RESULTS     EKG: All EKG's are interpreted by the Emergency Department Physician who either signs or Co-signs this chart in the absence of a cardiologist.    Interpreted by Tellis Alpers, DO     Rhythm: Sinus  Rate: 108  Axis: Left  Ectopy: None  Conduction: Sinus tachycardia. Left bundle branch block. Prolonged QTC. ST Segments: No acute elevation depression  T Waves: No acute finding    Clinical Impression: Nonspecific ECG. Sinus rhythm. No acute infarction/ischemia noted. Compared with ECG from 2019 from [facility] and the left bundle branch block is new since that time. RADIOLOGY:   Interpretation per the Radiologist below, if available at the time of this note:  No orders to display       No results found.     LABS:  Results for orders placed or performed during the hospital encounter of 10/03/22   Comprehensive Metabolic Panel   Result Value Ref Range    Glucose 171 (H) 70 - 99 mg/dL    BUN 18 8 - 23 mg/dL    Creatinine 0.77 0.50 - 0.90 mg/dL    Calcium 9.6 8.6 - 10.4 mg/dL    Sodium 143 135 - 144 mmol/L    Potassium 3.6 (L) 3.7 - 5.3 mmol/L    Chloride 104 98 - 107 mmol/L    CO2 26 20 - 31 mmol/L    Anion Gap 13 9 - 17 mmol/L    Alkaline Phosphatase 118 (H) 35 - 104 U/L    ALT 46 (H) 5 - 33 U/L    AST 30 <32 U/L    Total Bilirubin 0.3 0.3 - 1.2 mg/dL    Total Protein 6.6 6.4 - 8.3 g/dL    Albumin 4.2 3.5 - 5.2 g/dL    Albumin/Globulin Ratio 1.8 1.0 - 2.5    GFR Non-African American >60 >60 mL/min    GFR African American >60 >60 mL/min    GFR Comment         CBC with Auto Differential   Result Value Ref Range    WBC 11.5 (H) 3.5 - 11.0 k/uL    RBC 4.27 4.0 - 5.2 m/uL    Hemoglobin 14.0 12.0 - 16.0 g/dL    Hematocrit 40.8 36 - 46 %    MCV 95.6 80 - 100 fL    MCH 32.8 26 - 34 pg    MCHC 34.3 31 - 37 g/dL    RDW 13.2 12.5 - 15.4 %    Platelets 094 834 - 275 k/uL    MPV 10.4 8.0 - 14.0 fL    Seg Neutrophils 45 36 - 66 %    Lymphocytes 42 24 - 44 %    Atypical Lymphocytes 2 %    Monocytes 10 (H) 1 - 7 %    Eosinophils % 1 1 - 4 %    Basophils 0 0 - 2 %    Segs Absolute 5.17 1.8 - 7.7 k/uL    Absolute Lymph # 4.83 (H) 1.0 - 4.8 k/uL    Atypical Lymphocytes Absolute 0.23 k/uL    Absolute Mono # 1.15 (H) 0.1 - 0.8 k/uL    Absolute Eos # 0.12 0.0 - 0.4 k/uL    Basophils Absolute 0.00 0.0 - 0.2 k/uL    Morphology Normal    Protime-INR   Result Value Ref Range    Protime 10.2 9.4 - 12.6 sec    INR 1.0    APTT   Result Value Ref Range    PTT 22.4 21.3 - 31.3 sec   POCT troponin   Result Value Ref Range    POC Troponin I 0.01 0.00 - 0.10 ng/mL    POC Troponin Interp             EMERGENCY DEPARTMENT COURSE:     The patient was given the following medications:  Orders Placed This Encounter   Medications    ketorolac (TORADOL) injection 30 mg    orphenadrine (NORFLEX) injection 60 mg    oxyCODONE-acetaminophen (PERCOCET) 5-325 MG per tablet 2 tablet    oxyCODONE-acetaminophen (PERCOCET) 5-325 MG per tablet     Sig: Take 1 tablet by mouth every 6 hours as needed for Pain for up to 5 days. WARNING:  May cause drowsiness. May impair ability to operate vehicles or machinery. Do not use in combination with alcohol.      Dispense:  16 tablet     Refill:  0        Vitals:    Vitals:    10/03/22 0218 10/03/22 0233 10/03/22 0248 10/03/22 0303   BP:  (!) 157/90  (!) 142/80   Pulse: (!) 106 (!) 104 (!) 110 (!) 113   Resp: 16 27 18 16   Temp:       TempSrc:       SpO2: 96% 97% 97% 98%   Weight:       Height:         -------------------------  BP: (!) 142/80, Temp: 98.6 °F (37 °C), Heart Rate: (!) 113, Resp: 16      Re-evaluation Notes    Patient doing well reevaluation. Cardiac enzyme negative. She does have a \"new\" left bundle branch block of unknown time of onset however she has no chest symptoms otherwise and I do feel clinically her left shoulder pain is secondary to her chronic shoulder issues as it is tender and positional in nature. She has no chest pain. She does have good PCP follow-up and I advised following up regarding the left bundle branch block as I feel she will need further outpatient work-up. I do not feel she requires admission for this at this time. She does have a shoulder sling at home and I advised using it but not overusing it to avoid a frozen shoulder. I also advised to follow-up with her orthopedist who does her shoulder joint injections. She is comfortable with this plan and knows to return right away if worsening or for new or concerning symptoms. Her heart rate is in the 90s but when she moves her shoulder it comes up into the lower 100s. Will prescribe a short course of oxycodone as well. I do not feel imaging will be of any benefit this evening. The patient presented with shoulder pain. The elbow and wrist joints were not affected. No skin lesions were seen. There are no signs of compartment syndrome. The pulses are 2/4. The motor is 5/5. The sensation is intact. The patient was advised to return to the Emergency Department for increasing pain, numbness, weakness, or coldness to the extremity. The patient was further instructed to follow up in 2-3 days with their family doctor or orthopedics. The patient voiced understanding of these instructions.     The patient understands that at this time there is no evidence for a more malignant underlying process, but the patient also understands that early in the process of an illness or injury, an emergency department workup can be falsely reassuring. Routine discharge counseling was given, and the patient understands that worsening, changing or persistent symptoms should prompt an immediate call or follow up with their primary physician or return to the emergency department. The importance of appropriate follow up was also discussed. I have reviewed the disposition diagnosis with the patient and or their family/guardian. I have answered their questions and given discharge instructions. They voiced understanding of these instructions and did not have any further questions or complaints. CONSULTS:    None    CRITICAL CARE:     None    PROCEDURES:    None    FINAL IMPRESSION      1. Chronic left shoulder pain    2. Left bundle branch block          DISPOSITION/PLAN   DISPOSITION Decision To Discharge 10/03/2022 03:05:22 AM      Condition on Disposition    Improved    PATIENT REFERRED TO:  Ramona Herrera MD  [address]  [phone]  [phone]    Schedule an appointment as soon as possible for a visit in 1 day      DISCHARGE MEDICATIONS:  New Prescriptions    OXYCODONE-ACETAMINOPHEN (PERCOCET) 5-325 MG PER TABLET    Take 1 tablet by mouth every 6 hours as needed for Pain for up to 5 days. WARNING:  May cause drowsiness. May impair ability to operate vehicles or machinery. Do not use in combination with alcohol.        (Please note that portions of this note were completed with a voice recognition program.  Efforts were made to edit the dictations but occasionally words are mis-transcribed.)    Lauro Donovan DO, DO  Attending Emergency Physician       Lauro Donovan DO  10/03/22 [address] [place] [address],   10/03/22 5090

## 2022-12-28 RX ORDER — BENZONATATE 100 MG/1
100 CAPSULE ORAL 3 TIMES DAILY PRN
COMMUNITY

## 2022-12-29 ENCOUNTER — TELEMEDICINE (OUTPATIENT)
Dept: NEUROLOGY | Age: 62
End: 2022-12-29
Payer: COMMERCIAL

## 2022-12-29 DIAGNOSIS — E66.01 MORBID OBESITY (HCC): Primary | ICD-10-CM

## 2022-12-29 DIAGNOSIS — G43.109 MIGRAINE WITH AURA AND WITHOUT STATUS MIGRAINOSUS, NOT INTRACTABLE: ICD-10-CM

## 2022-12-29 PROCEDURE — 99214 OFFICE O/P EST MOD 30 MIN: CPT | Performed by: PSYCHIATRY & NEUROLOGY

## 2022-12-29 PROCEDURE — G8484 FLU IMMUNIZE NO ADMIN: HCPCS | Performed by: PSYCHIATRY & NEUROLOGY

## 2022-12-29 PROCEDURE — 1036F TOBACCO NON-USER: CPT | Performed by: PSYCHIATRY & NEUROLOGY

## 2022-12-29 PROCEDURE — G8427 DOCREV CUR MEDS BY ELIG CLIN: HCPCS | Performed by: PSYCHIATRY & NEUROLOGY

## 2022-12-29 PROCEDURE — G8417 CALC BMI ABV UP PARAM F/U: HCPCS | Performed by: PSYCHIATRY & NEUROLOGY

## 2022-12-29 PROCEDURE — 3017F COLORECTAL CA SCREEN DOC REV: CPT | Performed by: PSYCHIATRY & NEUROLOGY

## 2022-12-29 RX ORDER — PROMETHAZINE HYDROCHLORIDE 25 MG/1
TABLET ORAL
Qty: 90 TABLET | Refills: 1 | Status: SHIPPED | OUTPATIENT
Start: 2022-12-29

## 2022-12-29 RX ORDER — VERAPAMIL HYDROCHLORIDE 240 MG/1
TABLET, FILM COATED, EXTENDED RELEASE ORAL
Qty: 90 TABLET | Refills: 3 | Status: SHIPPED | OUTPATIENT
Start: 2022-12-29

## 2022-12-29 RX ORDER — BUTALBITAL, ACETAMINOPHEN AND CAFFEINE 50; 325; 40 MG/1; MG/1; MG/1
TABLET ORAL
Qty: 90 TABLET | Refills: 1 | Status: SHIPPED | OUTPATIENT
Start: 2022-12-29

## 2022-12-29 ASSESSMENT — ENCOUNTER SYMPTOMS
BACK PAIN: 1
GASTROINTESTINAL NEGATIVE: 1
RESPIRATORY NEGATIVE: 1
EYES NEGATIVE: 1
ALLERGIC/IMMUNOLOGIC NEGATIVE: 1

## 2022-12-29 NOTE — PROGRESS NOTES
Subjective:      Patient ID: Leilani Segovia is a 63 y.o. female. HPI  Active Problem migraine with aura on calan . The condition is she is having headaches two per month behind right eye of pressure throbbing of grade 8 over 10 with nausea and light sensitivity attenuated with fioricet and phenergan . She remains on calan  mg po qd tolerating medication well . She has been previously on fioricet with codeine having tried also imitrex , maxalt , cafergot , aleve and excedrin with no help . She has been also on topamax, inderal and depakote as prophylactic . There is no focal motor ,sensory,  bulbar or visual complaints . She has had 2 sleep studies because of headaches with no sleep apnea . There is obesity weighing 290 lb weight, BMI 50 . Significant medications calan  mg po qhs , fioricet PRN , phenergan 25 mg PRN Testing MRI of Head normal , 2014 . Carotid US  nonstenotic plaque .  Cardiac 2 D echo mild LVH EF 55-60 % , 2014      Past Medical History:   Diagnosis Date    Allergies     Anxiety     Arthritis     Gastroesophageal reflux disease     History of blood transfusion     Hyperlipidemia     IBS (irritable bowel syndrome)     Migraine with aura     Torn meniscus     right knee       Past Surgical History:   Procedure Laterality Date     SECTION      CHOLECYSTECTOMY      COLONOSCOPY      DILATION AND CURETTAGE OF UTERUS      with ablation    ENDOSCOPY, COLON, DIAGNOSTIC      FOOT SURGERY      GALLBLADDER SURGERY      HYSTERECTOMY (CERVIX STATUS UNKNOWN)  2008    KNEE CARTILAGE SURGERY Bilateral     OTHER SURGICAL HISTORY      uterine ablation    UPPER GASTROINTESTINAL ENDOSCOPY N/A 2022    EGD BIOPSY performed by Tyesha Duarte MD at [address] History   Problem Relation Age of Onset    Heart Disease Mother     Heart Disease Father     Hypertension Father     Diabetes Father     Stroke Father        Social History     Socioeconomic History    Marital status:    Tobacco Use    Smoking status: Never    Smokeless tobacco: Never   Vaping Use    Vaping Use: Never used   Substance and Sexual Activity    Alcohol use: Never    Drug use: No    Sexual activity: Not Currently     Partners: Male       Current Outpatient Medications   Medication Sig Dispense Refill    verapamil (CALAN SR) 240 MG extended release tablet TAKE ONE TABLET BY MOUTH EVERY NIGHT AT BEDTIME 90 tablet 3    butalbital-acetaminophen-caffeine (FIORICET, ESGIC) -40 MG per tablet TAKE 1 TABLET BY MOUTH EVERY 4 HOURS AS NEEDED FOR HEADACHE 90 tablet 1    promethazine (PHENERGAN) 25 MG tablet TAKE ONE TABLET BY MOUTH EVERY 8 HOURS AS NEEDED FOR NAUSEA 90 tablet 1    MILK THISTLE PO Take 175 mg by mouth daily      benzonatate (TESSALON) 100 MG capsule Take 100 mg by mouth 3 times daily as needed      Dextromethorphan-guaiFENesin (MUCINEX DM PO) Take by mouth      tiZANidine (ZANAFLEX) 4 MG tablet Take 4 mg by mouth every 6 hours as needed      ALPRAZolam (XANAX) 0.5 MG tablet Take 0.5 mg by mouth nightly as needed for Sleep.      furosemide (LASIX) 40 MG tablet Take 40 mg by mouth daily       Cholecalciferol (D3 VITAMIN PO) Take by mouth daily      vitamin C (ASCORBIC ACID) 500 MG tablet Take 500 mg by mouth 2 times daily       fluticasone (FLONASE) 50 MCG/ACT nasal spray 1 spray by Each Nostril route daily      olopatadine (PATANOL) 0.1 % ophthalmic solution Place 1 drop into both eyes 2 times daily      atorvastatin (LIPITOR) 40 MG tablet Take 80 mg by mouth daily       Omega-3 Fatty Acids (OMEGA 3 PO) Take by mouth daily      NONFORMULARY Indications: allgery shot once a week       omeprazole (PRILOSEC) 40 MG capsule Take 40 mg by mouth daily      Montelukast Sodium (SINGULAIR PO) Take by mouth daily      Levocetirizine Dihydrochloride (XYZAL PO) Take 1 tablet by mouth daily      estradiol 0.025 MG/24HR PTTW Place 1 patch onto the skin Every 4 days.       POTASSIUM CHLORIDE PO Take  by mouth daily.        meloxicam (MOBIC) 15 MG tablet 1 tablet nightly. Misc Natural Products (OSTEO BI-FLEX ADV DOUBLE ST PO) Take 1 capsule by mouth daily       Multiple Vitamin (MULTIVITAMIN PO) Take  by mouth daily. No current facility-administered medications for this visit. Allergies   Allergen Reactions    Avelox [Moxifloxacin] Anaphylaxis    Dilaudid [Hydromorphone Hcl] Anaphylaxis    Erythromycin Hives    Fentanyl      insomnia    Morphine      anxiety    Nubain [Nalbuphine Hcl]      Flu symptoms    Penicillins Hives    Prochlorperazine Edisylate      Hallucinations    Sulfa Antibiotics Hives    Tigan [Trimethobenzamide-Benzocaine]      HYPERTENSION       Review of Systems   Constitutional: Negative. HENT:  Positive for tinnitus. Eyes: Negative. Respiratory: Negative. Cardiovascular: Negative. Gastrointestinal: Negative. Endocrine: Negative. Genitourinary: Negative. Musculoskeletal:  Positive for arthralgias, back pain and myalgias. Allergic/Immunologic: Negative. Neurological:  Positive for speech difficulty and headaches. Hematological: Negative. Psychiatric/Behavioral:  The patient is nervous/anxious. Objective:   Physical Exam  There were no vitals filed for this visit. weight:      Neurological Examination  Constitutional .General exam well groomed   Head/Ears /Nose/Throat: external ear . Normal exam  Neck and thyroid . Normal size. No bruits  Respiratory . Breathsounds clear bilaterally  Cardiovascular:  Auscultation of heart with regular rate and rhythm  Musculoskeletal. Muscle bulk and tone normal                                                           Muscle strength 5/5 strength throughout                                                                                No dysmetria or dysdiadokinesis  No tremor   Normal fine motor  Gait normal   Orientation Alert and oriented x 3   Attention and concentration normal  Short term memory normal  Language process and speech normal . No aphasia   Cranial nerve 2 normal acuety and visual fields  Cranial nerve 3, 4 and 6 . Extraocular muscles are intact . Pupils are equal and reactive   Cranial nerve 5 . Normal strength of masseter and temporalis . Intact corneal reflex. Normal facial sensation  Cranial nerve 7 normal exam   Cranial nerve 8. Grossly intact hearing   Cranial nerve 9 and 10. Symmetric palate elevation   Cranial nerve 11 , 5 out of 5 strength   Cranial Nerve 12 midline tongue . No atrophy  Sensation . Normal proprioception . Intact Vibration . Normal pinprick and light touch   Deep Tendon Reflexes normal  Plantar response flexor bilaterally    Assessment:       Diagnosis Orders   1. Morbid obesity ([facility] [address]        2. Migraine with aura and without status migrainosus, not intractable  verapamil (CALAN SR) 240 MG extended release tablet      She is to continue current regimen     Plan:      As above       [employer], was evaluated through a synchronous (real-time) audio-video encounter. The patient (or guardian if applicable) is aware that this is a billable service, which includes applicable co-pays. This Virtual Visit was conducted with patient's (and/or legal guardian's) consent. The visit was conducted pursuant to the emergency declaration under the [address] [place] [address] authority and the [employer] General Act. Patient identification was verified, and a caregiver was present when appropriate. --John Badillo MD on 1/8/2023 at 4:38 PM    An electronic signature was used to authenticate this note.

## 2023-03-12 DIAGNOSIS — G43.109 MIGRAINE WITH AURA AND WITHOUT STATUS MIGRAINOSUS, NOT INTRACTABLE: Primary | ICD-10-CM

## 2023-03-13 RX ORDER — BUTALBITAL, ACETAMINOPHEN AND CAFFEINE 50; 325; 40 MG/1; MG/1; MG/1
TABLET ORAL
Qty: 90 TABLET | Refills: 1 | Status: SHIPPED | OUTPATIENT
Start: 2023-03-13

## 2023-03-13 NOTE — TELEPHONE ENCOUNTER
Patient sent a message through [employer] requesting a new Fioricet Rx.        Medication active on med list: yes      Date of last fill: 12/29/22 for #90 and 1 refill  verified on 3/13/2023    verified by Asha Singletary LPN      Date of last appointment: 12/29/2022    Next Visit Date: 1 yr

## 2023-06-19 DIAGNOSIS — G43.109 MIGRAINE WITH AURA AND WITHOUT STATUS MIGRAINOSUS, NOT INTRACTABLE: ICD-10-CM

## 2023-06-20 RX ORDER — BUTALBITAL, ACETAMINOPHEN AND CAFFEINE 50; 325; 40 MG/1; MG/1; MG/1
TABLET ORAL
Qty: 90 TABLET | Refills: 2 | Status: SHIPPED | OUTPATIENT
Start: 2023-06-20

## 2023-06-20 NOTE — TELEPHONE ENCOUNTER
Patient requesting refill of Fioricet -40 MG.       Medication active on med list yes      Date of last Rx: 3/13/2023 with 1 refills          verified by Shaan Fields LPN      Date of last appointment 12/29/2022    Next Visit Date:  Visit date not found (pt set to return around 12/29/2023)

## 2023-07-03 RX ORDER — PROMETHAZINE HYDROCHLORIDE 25 MG/1
TABLET ORAL
Qty: 90 TABLET | Refills: 1 | Status: SHIPPED | OUTPATIENT
Start: 2023-07-03

## 2023-07-03 NOTE — TELEPHONE ENCOUNTER
Pharmacy requesting refill of promethazine (PHENERGAN) 25 MG tablet       Medication active on med list yes      Date of last Rx: 12/29/2022 with 1 refills          verified by LACEY VYAS      Date of last appointment 12/29/2022    Next Visit Date:  Visit date not found

## 2023-10-22 DIAGNOSIS — G43.109 MIGRAINE WITH AURA AND WITHOUT STATUS MIGRAINOSUS, NOT INTRACTABLE: ICD-10-CM

## 2023-10-23 NOTE — TELEPHONE ENCOUNTER
Pharmacy requesting refill of Promethazine 25 mg.      Medication active on med list yes      Date of last Rx: 07/03/23 with 1 refills          verified by SR, RMA      Date of last appointment 12/29/22    Next Visit Date:  12/6/2023      Pharmacy requesting refill of Fioricet -40 mg.      Medication active on med list yes      Date of last Rx: 06/20/23 with 2 refills          verified by SR, RMA      Date of last appointment 12/29/22    Next Visit Date:  12/6/2023

## 2023-10-25 RX ORDER — PROMETHAZINE HYDROCHLORIDE 25 MG/1
TABLET ORAL
Qty: 90 TABLET | Refills: 5 | Status: SHIPPED | OUTPATIENT
Start: 2023-10-25

## 2023-10-25 RX ORDER — BUTALBITAL, ACETAMINOPHEN AND CAFFEINE 50; 325; 40 MG/1; MG/1; MG/1
TABLET ORAL
Qty: 90 TABLET | Refills: 5 | Status: SHIPPED | OUTPATIENT
Start: 2023-10-25

## 2023-12-06 ENCOUNTER — OFFICE VISIT (OUTPATIENT)
Dept: NEUROLOGY | Age: 63
End: 2023-12-06
Payer: COMMERCIAL

## 2023-12-06 VITALS
SYSTOLIC BLOOD PRESSURE: 119 MMHG | HEIGHT: 64 IN | BODY MASS INDEX: 50.02 KG/M2 | HEART RATE: 95 BPM | DIASTOLIC BLOOD PRESSURE: 75 MMHG | WEIGHT: 293 LBS

## 2023-12-06 DIAGNOSIS — E66.01 MORBID OBESITY (HCC): ICD-10-CM

## 2023-12-06 DIAGNOSIS — G43.109 MIGRAINE WITH AURA AND WITHOUT STATUS MIGRAINOSUS, NOT INTRACTABLE: Primary | ICD-10-CM

## 2023-12-06 PROCEDURE — 3017F COLORECTAL CA SCREEN DOC REV: CPT | Performed by: PSYCHIATRY & NEUROLOGY

## 2023-12-06 PROCEDURE — 99214 OFFICE O/P EST MOD 30 MIN: CPT | Performed by: PSYCHIATRY & NEUROLOGY

## 2023-12-06 PROCEDURE — G8417 CALC BMI ABV UP PARAM F/U: HCPCS | Performed by: PSYCHIATRY & NEUROLOGY

## 2023-12-06 PROCEDURE — 1036F TOBACCO NON-USER: CPT | Performed by: PSYCHIATRY & NEUROLOGY

## 2023-12-06 PROCEDURE — G8484 FLU IMMUNIZE NO ADMIN: HCPCS | Performed by: PSYCHIATRY & NEUROLOGY

## 2023-12-06 PROCEDURE — G8427 DOCREV CUR MEDS BY ELIG CLIN: HCPCS | Performed by: PSYCHIATRY & NEUROLOGY

## 2023-12-06 RX ORDER — MECLIZINE HYDROCHLORIDE 25 MG/1
TABLET ORAL
COMMUNITY
Start: 2023-09-06

## 2023-12-06 RX ORDER — VITAMIN E 268 MG
CAPSULE ORAL
COMMUNITY
Start: 2023-10-26

## 2023-12-06 RX ORDER — FAMOTIDINE 40 MG/1
TABLET, FILM COATED ORAL
COMMUNITY
Start: 2023-03-24

## 2023-12-06 RX ORDER — VERAPAMIL HYDROCHLORIDE 240 MG/1
TABLET, FILM COATED, EXTENDED RELEASE ORAL
Qty: 90 TABLET | Refills: 3 | Status: SHIPPED | OUTPATIENT
Start: 2023-12-06

## 2023-12-06 RX ORDER — ATENOLOL 50 MG/1
12.5 TABLET ORAL EVERY MORNING
COMMUNITY

## 2023-12-06 RX ORDER — LANOLIN ALCOHOL/MO/W.PET/CERES
CREAM (GRAM) TOPICAL
COMMUNITY
Start: 2023-11-13

## 2023-12-06 RX ORDER — CARBOXYMETHYLCELLULOSE SODIUM 10 MG/ML
GEL OPHTHALMIC
COMMUNITY
Start: 2023-11-30

## 2023-12-06 ASSESSMENT — ENCOUNTER SYMPTOMS
EYES NEGATIVE: 1
RESPIRATORY NEGATIVE: 1
GASTROINTESTINAL NEGATIVE: 1
BACK PAIN: 1
ALLERGIC/IMMUNOLOGIC NEGATIVE: 1

## 2023-12-06 NOTE — PROGRESS NOTES
Subjective:      Patient ID: Shiloh Looney is a 66 y.o. female. HPI  Active Problem migraine with aura on calan . The condition is she is having headaches two to three per month behind right eye of pressure throbbing of grade 5 over 10 with nausea and light sensitivity attenuated with fioricet and phenergan . 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 She has been previously on fioricet with codeine having tried also imitrex , maxalt , cafergot , aleve and excedrin with no help . She has been also on topamax, inderal and depakote as prophylactic . There is no focal motor ,sensory,  bulbar or visual complaints . She has had 2 sleep studies because of headaches with no sleep apnea . There is obesity weighing 293 lb weight, BMI 51 . Significant medications calan  mg po qhs , fioricet PRN , phenergan 25 mg PRN Testing MRI of Head normal , 2014 . Carotid US  nonstenotic plaque .  Cardiac 2 D echo mild LVH EF 55-60 % , 2014      Past Medical History:   Diagnosis Date    Allergies     Anxiety     Arthritis     Gastroesophageal reflux disease     History of blood transfusion     Hyperlipidemia     IBS (irritable bowel syndrome)     Migraine with aura     Torn meniscus     right knee       Past Surgical History:   Procedure Laterality Date     SECTION      CHOLECYSTECTOMY      COLONOSCOPY      DILATION AND CURETTAGE OF UTERUS      with ablation    ENDOSCOPY, COLON, DIAGNOSTIC      FOOT SURGERY      GALLBLADDER SURGERY      HYSTERECTOMY (CERVIX STATUS UNKNOWN)  2008    KNEE CARTILAGE SURGERY Bilateral     OTHER SURGICAL HISTORY      uterine ablation    UPPER GASTROINTESTINAL ENDOSCOPY N/A 2022    EGD BIOPSY performed by Akshat Gillette MD at [address] History   Problem Relation Age of Onset    Heart Disease Mother     Heart Disease Father     Hypertension Father     Diabetes Father     Stroke Father

## 2024-04-21 DIAGNOSIS — G43.109 MIGRAINE WITH AURA AND WITHOUT STATUS MIGRAINOSUS, NOT INTRACTABLE: ICD-10-CM

## 2024-04-22 RX ORDER — BUTALBITAL, ACETAMINOPHEN AND CAFFEINE 50; 325; 40 MG/1; MG/1; MG/1
TABLET ORAL
Qty: 90 TABLET | Refills: 2 | Status: SHIPPED | OUTPATIENT
Start: 2024-04-22

## 2024-04-22 NOTE — TELEPHONE ENCOUNTER
Pharmacy requesting refill of Fioricet.      Medication active on med list yes      Date of last Rx: 10/25/2023 with 5 refills          verified by LACEY KEENE      Date of last appointment 12/6/2023    Next Visit Date:  Visit date not found

## 2024-10-06 DIAGNOSIS — G43.109 MIGRAINE WITH AURA AND WITHOUT STATUS MIGRAINOSUS, NOT INTRACTABLE: ICD-10-CM

## 2024-10-07 RX ORDER — PROMETHAZINE HYDROCHLORIDE 25 MG/1
TABLET ORAL
Qty: 90 TABLET | Refills: 3 | Status: SHIPPED | OUTPATIENT
Start: 2024-10-07

## 2024-10-07 RX ORDER — BUTALBITAL, ACETAMINOPHEN AND CAFFEINE 50; 325; 40 MG/1; MG/1; MG/1
TABLET ORAL
Qty: 90 TABLET | Refills: 2 | Status: SHIPPED | OUTPATIENT
Start: 2024-10-07

## 2024-10-07 NOTE — TELEPHONE ENCOUNTER
Pharmacy requesting refill of Phenergan 25 mg.    Medication active on med list yes    Date of last Rx: 10/25/23 #90 with 5 refills   verified by SIMI Desai    Date of last appointment 12/6/23    Next Visit Date:  12/2/2024        Pharmacy requesting refill of Fioricet -40.    Medication active on med list yes    Date of last Rx: 4/22/24 #90 with 2 refills   verified by SIMI Desai    Date of last appointment 12/2/24    Next Visit Date:  12/2/2024

## 2024-12-02 ENCOUNTER — OFFICE VISIT (OUTPATIENT)
Dept: NEUROLOGY | Age: 64
End: 2024-12-02
Payer: COMMERCIAL

## 2024-12-02 VITALS
DIASTOLIC BLOOD PRESSURE: 78 MMHG | WEIGHT: 212.2 LBS | SYSTOLIC BLOOD PRESSURE: 113 MMHG | HEIGHT: 64 IN | BODY MASS INDEX: 36.23 KG/M2 | HEART RATE: 82 BPM

## 2024-12-02 DIAGNOSIS — E66.01 MORBID OBESITY: ICD-10-CM

## 2024-12-02 DIAGNOSIS — G43.109 MIGRAINE WITH AURA AND WITHOUT STATUS MIGRAINOSUS, NOT INTRACTABLE: Primary | ICD-10-CM

## 2024-12-02 PROCEDURE — G8427 DOCREV CUR MEDS BY ELIG CLIN: HCPCS | Performed by: PSYCHIATRY & NEUROLOGY

## 2024-12-02 PROCEDURE — 99214 OFFICE O/P EST MOD 30 MIN: CPT | Performed by: PSYCHIATRY & NEUROLOGY

## 2024-12-02 PROCEDURE — 3017F COLORECTAL CA SCREEN DOC REV: CPT | Performed by: PSYCHIATRY & NEUROLOGY

## 2024-12-02 PROCEDURE — 4004F PT TOBACCO SCREEN RCVD TLK: CPT | Performed by: PSYCHIATRY & NEUROLOGY

## 2024-12-02 PROCEDURE — G8484 FLU IMMUNIZE NO ADMIN: HCPCS | Performed by: PSYCHIATRY & NEUROLOGY

## 2024-12-02 PROCEDURE — G8417 CALC BMI ABV UP PARAM F/U: HCPCS | Performed by: PSYCHIATRY & NEUROLOGY

## 2024-12-02 RX ORDER — VERAPAMIL HYDROCHLORIDE 240 MG/1
TABLET, FILM COATED, EXTENDED RELEASE ORAL
Qty: 90 TABLET | Refills: 3 | Status: SHIPPED | OUTPATIENT
Start: 2024-12-02

## 2024-12-02 ASSESSMENT — ENCOUNTER SYMPTOMS
GASTROINTESTINAL NEGATIVE: 1
RESPIRATORY NEGATIVE: 1
BACK PAIN: 1
EYES NEGATIVE: 1
ALLERGIC/IMMUNOLOGIC NEGATIVE: 1

## 2024-12-02 NOTE — PROGRESS NOTES
mouth daily      vitamin C (ASCORBIC ACID) 500 MG tablet Take 1 tablet by mouth 2 times daily      fluticasone (FLONASE) 50 MCG/ACT nasal spray 1 spray by Each Nostril route daily      olopatadine (PATANOL) 0.1 % ophthalmic solution Place 1 drop into both eyes 2 times daily      atorvastatin (LIPITOR) 40 MG tablet Take 1 tablet by mouth daily      Omega-3 Fatty Acids (OMEGA 3 PO) Take by mouth daily      NONFORMULARY Indications: allgery shot once a week       omeprazole (PRILOSEC) 40 MG capsule Take 1 capsule by mouth daily      Montelukast Sodium (SINGULAIR PO) Take by mouth daily      Levocetirizine Dihydrochloride (XYZAL PO) Take 1 tablet by mouth daily      estradiol 0.025 MG/24HR PTTW Place 1 patch onto the skin Every 4 days.      POTASSIUM CHLORIDE PO Take  by mouth daily.        meloxicam (MOBIC) 15 MG tablet 1 tablet nightly       No current facility-administered medications for this visit.       Allergies   Allergen Reactions    Avelox [Moxifloxacin] Anaphylaxis    Dilaudid [Hydromorphone Hcl] Anaphylaxis    Erythromycin Hives    Fentanyl      insomnia    Morphine      anxiety    Nubain [Nalbuphine Hcl]      Flu symptoms    Penicillins Hives    Prochlorperazine Edisylate      Hallucinations    Sulfa Antibiotics Hives    Tigan [Trimethobenzamide-Benzocaine]      HYPERTENSION       Review of Systems   Constitutional: Negative.    HENT:  Positive for tinnitus.    Eyes: Negative.    Respiratory: Negative.     Cardiovascular: Negative.    Gastrointestinal: Negative.    Endocrine: Negative.    Genitourinary: Negative.    Musculoskeletal:  Positive for arthralgias, back pain and myalgias.   Allergic/Immunologic: Negative.    Neurological:  Positive for speech difficulty and headaches.   Hematological: Negative.    Psychiatric/Behavioral:  The patient is nervous/anxious.        Objective:   Physical Exam  Vitals:    12/02/24 1132   BP: 113/78   Pulse: 82       weight: 96.3 kg (212 lb 3.2 oz)    Neurological

## 2024-12-16 DIAGNOSIS — G43.109 MIGRAINE WITH AURA AND WITHOUT STATUS MIGRAINOSUS, NOT INTRACTABLE: ICD-10-CM

## 2024-12-16 NOTE — TELEPHONE ENCOUNTER
Pharmacy requesting refill of Fioricet -40 mg tablets.    Medication active on med list yes    Date of last Rx: 10/7/2024 #90 with 2 refills   verified by SIMI Desai    Date of last appointment 12/2/2024    Next Visit Date:  Visit date not found

## 2024-12-17 RX ORDER — BUTALBITAL, ACETAMINOPHEN AND CAFFEINE 50; 325; 40 MG/1; MG/1; MG/1
TABLET ORAL
Qty: 90 TABLET | Refills: 2 | Status: SHIPPED | OUTPATIENT
Start: 2024-12-17

## 2025-02-11 DIAGNOSIS — G43.109 MIGRAINE WITH AURA AND WITHOUT STATUS MIGRAINOSUS, NOT INTRACTABLE: ICD-10-CM

## 2025-02-11 RX ORDER — BUTALBITAL, ACETAMINOPHEN AND CAFFEINE 50; 325; 40 MG/1; MG/1; MG/1
1 TABLET ORAL EVERY 4 HOURS PRN
Qty: 90 TABLET | Refills: 2 | Status: SHIPPED | OUTPATIENT
Start: 2025-02-11

## 2025-02-11 RX ORDER — PROMETHAZINE HYDROCHLORIDE 25 MG/1
25 TABLET ORAL EVERY 8 HOURS PRN
Qty: 90 TABLET | Refills: 2 | Status: SHIPPED | OUTPATIENT
Start: 2025-02-11

## 2025-02-11 NOTE — TELEPHONE ENCOUNTER
Pharmacy requesting refill of Fioricet -40 mg tablets.    Medication active on med list yes    Date of last Rx: 12/17/2024 #90 with 2 refills   verified by SIMI Desai    Date of last appointment 12/2/2024    Next Visit Date:  Visit date not found        Pharmacy requesting refill of Phenergan 25 mg tablets.    Medication active on med list yes    Date of last Rx: 10/7/2024 #90 with 3 refills   verified by SIMI Desai    Date of last appointment 12/2/2024    Next Visit Date:  Visit date not found

## 2025-03-11 ENCOUNTER — HOSPITAL ENCOUNTER (OUTPATIENT)
Dept: SLEEP CENTER | Age: 65
Discharge: HOME | End: 2025-03-13
Payer: COMMERCIAL

## 2025-03-11 PROCEDURE — G0399 HOME SLEEP TEST/TYPE 3 PORTA: HCPCS

## 2025-04-15 DIAGNOSIS — G43.109 MIGRAINE WITH AURA AND WITHOUT STATUS MIGRAINOSUS, NOT INTRACTABLE: ICD-10-CM

## 2025-04-16 NOTE — TELEPHONE ENCOUNTER
Pharmacy requesting refill of Fioricet -40 mg tablets.    Medication active on med list yes    Date of last Rx: 2/11/2025 #90 with 2 refills   verified by SIMI Desai    Date of last appointment 12/2/2024    Next Visit Date:  Visit date not found

## 2025-04-17 DIAGNOSIS — G43.109 MIGRAINE WITH AURA AND WITHOUT STATUS MIGRAINOSUS, NOT INTRACTABLE: ICD-10-CM

## 2025-04-18 RX ORDER — BUTALBITAL, ACETAMINOPHEN AND CAFFEINE 50; 325; 40 MG/1; MG/1; MG/1
1 TABLET ORAL EVERY 4 HOURS PRN
Qty: 90 TABLET | Refills: 2 | OUTPATIENT
Start: 2025-04-18

## 2025-04-18 NOTE — TELEPHONE ENCOUNTER
I reached out to pharmacy, script was filled on 3/26/2025, Dr. Lomas stated on previous RX request too soon to fill. Pharmacy advised that the script instructions state patient can have a tablet every 4 hours, with max tablet amount/day of 6 tablets and a 90 day supply will last 15 days if taking per instructions. I advised typically we require the scripts to last for 30 days. Pharmacy states if we want the script to last a set amount of days despite the sig we need to state that or else they will request the refill for the patient.     Corrected script to say RX must last 30 days, new script attached for review. Pharmacy is requesting additional medication for patient since she is out, please advise.

## 2025-04-27 RX ORDER — BUTALBITAL, ACETAMINOPHEN AND CAFFEINE 50; 325; 40 MG/1; MG/1; MG/1
TABLET ORAL
Qty: 90 TABLET | Refills: 0 | Status: SHIPPED | OUTPATIENT
Start: 2025-04-27

## 2025-06-01 DIAGNOSIS — G43.109 MIGRAINE WITH AURA AND WITHOUT STATUS MIGRAINOSUS, NOT INTRACTABLE: ICD-10-CM

## 2025-06-02 NOTE — TELEPHONE ENCOUNTER
Patient requesting refill of Fioricet.      Medication active on med list yes      Date of last fill: 4/27/25  verified on 6/2/2025   verified by SF LPN      Date of last appointment 12/2/24    Next Visit Date:  Visit date not found, pt to follow up in 1 year (12/2025)    Please approve for Dr Lomas as he is currently out of office, thanks.

## 2025-06-03 RX ORDER — BUTALBITAL, ACETAMINOPHEN AND CAFFEINE 50; 325; 40 MG/1; MG/1; MG/1
TABLET ORAL
Qty: 90 TABLET | Refills: 0 | Status: SHIPPED | OUTPATIENT
Start: 2025-06-03

## 2025-07-01 DIAGNOSIS — G43.109 MIGRAINE WITH AURA AND WITHOUT STATUS MIGRAINOSUS, NOT INTRACTABLE: ICD-10-CM

## 2025-07-02 RX ORDER — BUTALBITAL, ACETAMINOPHEN AND CAFFEINE 50; 325; 40 MG/1; MG/1; MG/1
TABLET ORAL
Qty: 90 TABLET | Refills: 0 | Status: SHIPPED | OUTPATIENT
Start: 2025-07-02 | End: 2025-08-02 | Stop reason: SDUPTHER

## (undated) DEVICE — BLOCK BITE 60FR RUBBER ADLT DENTAL

## (undated) DEVICE — GAUZE,SPONGE,4"X4",16PLY,STRL,LF,10/TRAY: Brand: MEDLINE

## (undated) DEVICE — CUP MED 1OZ CLR POLYPR FEED GRAD W/O LID

## (undated) DEVICE — JELLY,LUBE,STERILE,FLIP TOP,TUBE,2-OZ: Brand: MEDLINE

## (undated) DEVICE — MEDICINE CUP, GRADUATED, STER: Brand: MEDLINE

## (undated) DEVICE — BASIN EMSIS 700ML GRAPHITE PLAS KID SHP GRAD

## (undated) DEVICE — ADAPTER TBNG LUER STUB 15 GA INTMED

## (undated) DEVICE — GLOVE SURG 8 11.7IN BEAD CUF LIGHT BRN SENSICARE LTX FREE

## (undated) DEVICE — CONMED DISPOSABLE GASTROINTESTINAL CYTOLOGY BRUSH, STRAIGHT HANDLE, 2.5 MM X 160 CM: Brand: CONMED

## (undated) DEVICE — CO2 CANNULA,SUPERSOFT, ADLT,7'O2,7'CO2: Brand: MEDLINE

## (undated) DEVICE — FORCEPS BX L240CM WRK CHN 2.8MM STD CAP W/ NDL MIC MESH

## (undated) DEVICE — Device: Brand: DEFENDO VALVE AND CONNECTOR KIT